# Patient Record
Sex: FEMALE | URBAN - METROPOLITAN AREA
[De-identification: names, ages, dates, MRNs, and addresses within clinical notes are randomized per-mention and may not be internally consistent; named-entity substitution may affect disease eponyms.]

---

## 2019-12-01 ENCOUNTER — OUTPATIENT (OUTPATIENT)
Dept: OUTPATIENT SERVICES | Facility: HOSPITAL | Age: 74
LOS: 1 days | End: 2019-12-01
Payer: MEDICAID

## 2019-12-01 ENCOUNTER — OUTPATIENT (OUTPATIENT)
Dept: OUTPATIENT SERVICES | Facility: HOSPITAL | Age: 74
LOS: 1 days | End: 2019-12-01

## 2019-12-01 PROCEDURE — G9005: CPT

## 2019-12-28 ENCOUNTER — INPATIENT (INPATIENT)
Facility: HOSPITAL | Age: 74
LOS: 2 days | Discharge: HOME HEALTH | End: 2019-12-31
Attending: HOSPITALIST | Admitting: HOSPITALIST
Payer: MEDICAID

## 2019-12-28 VITALS
OXYGEN SATURATION: 100 % | HEART RATE: 125 BPM | RESPIRATION RATE: 18 BRPM | TEMPERATURE: 98 F | SYSTOLIC BLOOD PRESSURE: 155 MMHG | DIASTOLIC BLOOD PRESSURE: 100 MMHG

## 2019-12-28 LAB
ALBUMIN SERPL ELPH-MCNC: 3.5 G/DL — SIGNIFICANT CHANGE UP (ref 3.3–5)
ALP SERPL-CCNC: 79 U/L — SIGNIFICANT CHANGE UP (ref 40–120)
ALT FLD-CCNC: 41 U/L — HIGH (ref 4–33)
ANION GAP SERPL CALC-SCNC: 14 MMO/L — SIGNIFICANT CHANGE UP (ref 7–14)
APPEARANCE UR: CLEAR — SIGNIFICANT CHANGE UP
AST SERPL-CCNC: 70 U/L — HIGH (ref 4–32)
BACTERIA # UR AUTO: NEGATIVE — SIGNIFICANT CHANGE UP
BASOPHILS # BLD AUTO: 0.06 K/UL — SIGNIFICANT CHANGE UP (ref 0–0.2)
BASOPHILS NFR BLD AUTO: 0.5 % — SIGNIFICANT CHANGE UP (ref 0–2)
BILIRUB SERPL-MCNC: < 0.2 MG/DL — LOW (ref 0.2–1.2)
BILIRUB UR-MCNC: NEGATIVE — SIGNIFICANT CHANGE UP
BLOOD UR QL VISUAL: SIGNIFICANT CHANGE UP
BUN SERPL-MCNC: 12 MG/DL — SIGNIFICANT CHANGE UP (ref 7–23)
CALCIUM SERPL-MCNC: 9.3 MG/DL — SIGNIFICANT CHANGE UP (ref 8.4–10.5)
CHLORIDE SERPL-SCNC: 103 MMOL/L — SIGNIFICANT CHANGE UP (ref 98–107)
CO2 SERPL-SCNC: 21 MMOL/L — LOW (ref 22–31)
COLOR SPEC: COLORLESS — SIGNIFICANT CHANGE UP
CREAT SERPL-MCNC: 0.73 MG/DL — SIGNIFICANT CHANGE UP (ref 0.5–1.3)
EOSINOPHIL # BLD AUTO: 0.22 K/UL — SIGNIFICANT CHANGE UP (ref 0–0.5)
EOSINOPHIL NFR BLD AUTO: 1.7 % — SIGNIFICANT CHANGE UP (ref 0–6)
GLUCOSE SERPL-MCNC: 187 MG/DL — HIGH (ref 70–99)
GLUCOSE UR-MCNC: NEGATIVE — SIGNIFICANT CHANGE UP
HCT VFR BLD CALC: 33.9 % — LOW (ref 34.5–45)
HGB BLD-MCNC: 10.3 G/DL — LOW (ref 11.5–15.5)
HYALINE CASTS # UR AUTO: NEGATIVE — SIGNIFICANT CHANGE UP
IMM GRANULOCYTES NFR BLD AUTO: 0.6 % — SIGNIFICANT CHANGE UP (ref 0–1.5)
KETONES UR-MCNC: NEGATIVE — SIGNIFICANT CHANGE UP
LEUKOCYTE ESTERASE UR-ACNC: SIGNIFICANT CHANGE UP
LYMPHOCYTES # BLD AUTO: 18 % — SIGNIFICANT CHANGE UP (ref 13–44)
LYMPHOCYTES # BLD AUTO: 2.28 K/UL — SIGNIFICANT CHANGE UP (ref 1–3.3)
MAGNESIUM SERPL-MCNC: 2.1 MG/DL — SIGNIFICANT CHANGE UP (ref 1.6–2.6)
MCHC RBC-ENTMCNC: 24.1 PG — LOW (ref 27–34)
MCHC RBC-ENTMCNC: 30.4 % — LOW (ref 32–36)
MCV RBC AUTO: 79.2 FL — LOW (ref 80–100)
MONOCYTES # BLD AUTO: 0.82 K/UL — SIGNIFICANT CHANGE UP (ref 0–0.9)
MONOCYTES NFR BLD AUTO: 6.5 % — SIGNIFICANT CHANGE UP (ref 2–14)
NEUTROPHILS # BLD AUTO: 9.18 K/UL — HIGH (ref 1.8–7.4)
NEUTROPHILS NFR BLD AUTO: 72.7 % — SIGNIFICANT CHANGE UP (ref 43–77)
NITRITE UR-MCNC: NEGATIVE — SIGNIFICANT CHANGE UP
NRBC # FLD: 0 K/UL — SIGNIFICANT CHANGE UP (ref 0–0)
PH UR: 6.5 — SIGNIFICANT CHANGE UP (ref 5–8)
PHOSPHATE SERPL-MCNC: 3.1 MG/DL — SIGNIFICANT CHANGE UP (ref 2.5–4.5)
PLATELET # BLD AUTO: 241 K/UL — SIGNIFICANT CHANGE UP (ref 150–400)
PMV BLD: 11.1 FL — SIGNIFICANT CHANGE UP (ref 7–13)
POTASSIUM SERPL-MCNC: 4.6 MMOL/L — SIGNIFICANT CHANGE UP (ref 3.5–5.3)
POTASSIUM SERPL-SCNC: 4.6 MMOL/L — SIGNIFICANT CHANGE UP (ref 3.5–5.3)
PROT SERPL-MCNC: 7.6 G/DL — SIGNIFICANT CHANGE UP (ref 6–8.3)
PROT UR-MCNC: NEGATIVE — SIGNIFICANT CHANGE UP
RBC # BLD: 4.28 M/UL — SIGNIFICANT CHANGE UP (ref 3.8–5.2)
RBC # FLD: 19.4 % — HIGH (ref 10.3–14.5)
RBC CASTS # UR COMP ASSIST: SIGNIFICANT CHANGE UP (ref 0–?)
SODIUM SERPL-SCNC: 138 MMOL/L — SIGNIFICANT CHANGE UP (ref 135–145)
SP GR SPEC: 1.01 — SIGNIFICANT CHANGE UP (ref 1–1.04)
SQUAMOUS # UR AUTO: SIGNIFICANT CHANGE UP
TROPONIN T, HIGH SENSITIVITY: < 6 NG/L — SIGNIFICANT CHANGE UP (ref ?–14)
TSH SERPL-MCNC: 4.19 UIU/ML — SIGNIFICANT CHANGE UP (ref 0.27–4.2)
UROBILINOGEN FLD QL: NORMAL — SIGNIFICANT CHANGE UP
WBC # BLD: 12.64 K/UL — HIGH (ref 3.8–10.5)
WBC # FLD AUTO: 12.64 K/UL — HIGH (ref 3.8–10.5)
WBC UR QL: SIGNIFICANT CHANGE UP (ref 0–?)

## 2019-12-28 PROCEDURE — 71045 X-RAY EXAM CHEST 1 VIEW: CPT | Mod: 26

## 2019-12-28 RX ORDER — SODIUM CHLORIDE 9 MG/ML
2000 INJECTION, SOLUTION INTRAVENOUS ONCE
Refills: 0 | Status: COMPLETED | OUTPATIENT
Start: 2019-12-28 | End: 2019-12-28

## 2019-12-28 RX ORDER — MECLIZINE HCL 12.5 MG
25 TABLET ORAL ONCE
Refills: 0 | Status: COMPLETED | OUTPATIENT
Start: 2019-12-28 | End: 2019-12-28

## 2019-12-28 RX ADMIN — SODIUM CHLORIDE 1000 MILLILITER(S): 9 INJECTION, SOLUTION INTRAVENOUS at 21:02

## 2019-12-28 NOTE — ED ADULT TRIAGE NOTE - CHIEF COMPLAINT QUOTE
Pt c/o weakness and dizziness x few weeks. PMH hypothyroid, htn, high cholesterol, DM.  in the field

## 2019-12-28 NOTE — ED ADULT NURSE NOTE - OBJECTIVE STATEMENT
ALOCx4 Pashto primary language. Pt complaining of generalized weakness to entire body x 3 weeks. Also comlpaining of on and off dizziness x 6 months upon arrival to the [place].

## 2019-12-29 DIAGNOSIS — E78.5 HYPERLIPIDEMIA, UNSPECIFIED: ICD-10-CM

## 2019-12-29 DIAGNOSIS — G91.9 HYDROCEPHALUS, UNSPECIFIED: ICD-10-CM

## 2019-12-29 DIAGNOSIS — E11.9 TYPE 2 DIABETES MELLITUS WITHOUT COMPLICATIONS: ICD-10-CM

## 2019-12-29 DIAGNOSIS — R42 DIZZINESS AND GIDDINESS: ICD-10-CM

## 2019-12-29 DIAGNOSIS — K21.9 GASTRO-ESOPHAGEAL REFLUX DISEASE WITHOUT ESOPHAGITIS: ICD-10-CM

## 2019-12-29 DIAGNOSIS — I10 ESSENTIAL (PRIMARY) HYPERTENSION: ICD-10-CM

## 2019-12-29 DIAGNOSIS — D64.9 ANEMIA, UNSPECIFIED: ICD-10-CM

## 2019-12-29 DIAGNOSIS — J45.909 UNSPECIFIED ASTHMA, UNCOMPLICATED: ICD-10-CM

## 2019-12-29 DIAGNOSIS — Z29.9 ENCOUNTER FOR PROPHYLACTIC MEASURES, UNSPECIFIED: ICD-10-CM

## 2019-12-29 DIAGNOSIS — R26.81 UNSTEADINESS ON FEET: ICD-10-CM

## 2019-12-29 LAB
ANION GAP SERPL CALC-SCNC: 13 MMO/L — SIGNIFICANT CHANGE UP (ref 7–14)
BUN SERPL-MCNC: 8 MG/DL — SIGNIFICANT CHANGE UP (ref 7–23)
CALCIUM SERPL-MCNC: 9.7 MG/DL — SIGNIFICANT CHANGE UP (ref 8.4–10.5)
CHLORIDE SERPL-SCNC: 103 MMOL/L — SIGNIFICANT CHANGE UP (ref 98–107)
CO2 SERPL-SCNC: 26 MMOL/L — SIGNIFICANT CHANGE UP (ref 22–31)
CREAT SERPL-MCNC: 0.65 MG/DL — SIGNIFICANT CHANGE UP (ref 0.5–1.3)
GLUCOSE BLDC GLUCOMTR-MCNC: 179 MG/DL — HIGH (ref 70–99)
GLUCOSE BLDC GLUCOMTR-MCNC: 188 MG/DL — HIGH (ref 70–99)
GLUCOSE BLDC GLUCOMTR-MCNC: 222 MG/DL — HIGH (ref 70–99)
GLUCOSE BLDC GLUCOMTR-MCNC: 268 MG/DL — HIGH (ref 70–99)
GLUCOSE BLDC GLUCOMTR-MCNC: 93 MG/DL — SIGNIFICANT CHANGE UP (ref 70–99)
GLUCOSE SERPL-MCNC: 119 MG/DL — HIGH (ref 70–99)
HCT VFR BLD CALC: 37.8 % — SIGNIFICANT CHANGE UP (ref 34.5–45)
HGB BLD-MCNC: 11.2 G/DL — LOW (ref 11.5–15.5)
MAGNESIUM SERPL-MCNC: 2.1 MG/DL — SIGNIFICANT CHANGE UP (ref 1.6–2.6)
MCHC RBC-ENTMCNC: 24 PG — LOW (ref 27–34)
MCHC RBC-ENTMCNC: 29.6 % — LOW (ref 32–36)
MCV RBC AUTO: 81.1 FL — SIGNIFICANT CHANGE UP (ref 80–100)
NRBC # FLD: 0 K/UL — SIGNIFICANT CHANGE UP (ref 0–0)
PHOSPHATE SERPL-MCNC: 3.1 MG/DL — SIGNIFICANT CHANGE UP (ref 2.5–4.5)
PLATELET # BLD AUTO: 252 K/UL — SIGNIFICANT CHANGE UP (ref 150–400)
PMV BLD: 11.8 FL — SIGNIFICANT CHANGE UP (ref 7–13)
POTASSIUM SERPL-MCNC: 3.9 MMOL/L — SIGNIFICANT CHANGE UP (ref 3.5–5.3)
POTASSIUM SERPL-SCNC: 3.9 MMOL/L — SIGNIFICANT CHANGE UP (ref 3.5–5.3)
RBC # BLD: 4.66 M/UL — SIGNIFICANT CHANGE UP (ref 3.8–5.2)
RBC # FLD: 19.7 % — HIGH (ref 10.3–14.5)
SODIUM SERPL-SCNC: 142 MMOL/L — SIGNIFICANT CHANGE UP (ref 135–145)
WBC # BLD: 11.44 K/UL — HIGH (ref 3.8–10.5)
WBC # FLD AUTO: 11.44 K/UL — HIGH (ref 3.8–10.5)

## 2019-12-29 PROCEDURE — 99223 1ST HOSP IP/OBS HIGH 75: CPT | Mod: GC

## 2019-12-29 PROCEDURE — 70496 CT ANGIOGRAPHY HEAD: CPT | Mod: 26

## 2019-12-29 PROCEDURE — 70498 CT ANGIOGRAPHY NECK: CPT | Mod: 26

## 2019-12-29 RX ORDER — LOSARTAN/HYDROCHLOROTHIAZIDE 100MG-25MG
1 TABLET ORAL
Qty: 0 | Refills: 0 | DISCHARGE

## 2019-12-29 RX ORDER — ENOXAPARIN SODIUM 100 MG/ML
40 INJECTION SUBCUTANEOUS DAILY
Refills: 0 | Status: DISCONTINUED | OUTPATIENT
Start: 2019-12-29 | End: 2019-12-31

## 2019-12-29 RX ORDER — HYDROXYZINE HCL 10 MG
10 TABLET ORAL DAILY
Refills: 0 | Status: DISCONTINUED | OUTPATIENT
Start: 2019-12-29 | End: 2019-12-29

## 2019-12-29 RX ORDER — INFLUENZA VIRUS VACCINE 15; 15; 15; 15 UG/.5ML; UG/.5ML; UG/.5ML; UG/.5ML
0.5 SUSPENSION INTRAMUSCULAR ONCE
Refills: 0 | Status: DISCONTINUED | OUTPATIENT
Start: 2019-12-29 | End: 2019-12-31

## 2019-12-29 RX ORDER — INSULIN LISPRO 100/ML
VIAL (ML) SUBCUTANEOUS AT BEDTIME
Refills: 0 | Status: DISCONTINUED | OUTPATIENT
Start: 2019-12-29 | End: 2019-12-31

## 2019-12-29 RX ORDER — DEXTROSE 50 % IN WATER 50 %
25 SYRINGE (ML) INTRAVENOUS ONCE
Refills: 0 | Status: DISCONTINUED | OUTPATIENT
Start: 2019-12-29 | End: 2019-12-31

## 2019-12-29 RX ORDER — DEXTROSE 50 % IN WATER 50 %
15 SYRINGE (ML) INTRAVENOUS ONCE
Refills: 0 | Status: DISCONTINUED | OUTPATIENT
Start: 2019-12-29 | End: 2019-12-31

## 2019-12-29 RX ORDER — FAMOTIDINE 10 MG/ML
1 INJECTION INTRAVENOUS
Qty: 0 | Refills: 0 | DISCHARGE

## 2019-12-29 RX ORDER — SIMVASTATIN 20 MG/1
20 TABLET, FILM COATED ORAL AT BEDTIME
Refills: 0 | Status: DISCONTINUED | OUTPATIENT
Start: 2019-12-29 | End: 2019-12-31

## 2019-12-29 RX ORDER — FERROUS SULFATE 325(65) MG
1 TABLET ORAL
Qty: 0 | Refills: 0 | DISCHARGE

## 2019-12-29 RX ORDER — FAMOTIDINE 10 MG/ML
20 INJECTION INTRAVENOUS DAILY
Refills: 0 | Status: DISCONTINUED | OUTPATIENT
Start: 2019-12-29 | End: 2019-12-31

## 2019-12-29 RX ORDER — FERROUS SULFATE 325(65) MG
325 TABLET ORAL DAILY
Refills: 0 | Status: DISCONTINUED | OUTPATIENT
Start: 2019-12-29 | End: 2019-12-31

## 2019-12-29 RX ORDER — MONTELUKAST 4 MG/1
10 TABLET, CHEWABLE ORAL DAILY
Refills: 0 | Status: DISCONTINUED | OUTPATIENT
Start: 2019-12-29 | End: 2019-12-31

## 2019-12-29 RX ORDER — LOSARTAN POTASSIUM 100 MG/1
50 TABLET, FILM COATED ORAL DAILY
Refills: 0 | Status: DISCONTINUED | OUTPATIENT
Start: 2019-12-29 | End: 2019-12-31

## 2019-12-29 RX ORDER — HYDROCHLOROTHIAZIDE 25 MG
12.5 TABLET ORAL DAILY
Refills: 0 | Status: DISCONTINUED | OUTPATIENT
Start: 2019-12-29 | End: 2019-12-29

## 2019-12-29 RX ORDER — DEXTROSE 50 % IN WATER 50 %
12.5 SYRINGE (ML) INTRAVENOUS ONCE
Refills: 0 | Status: DISCONTINUED | OUTPATIENT
Start: 2019-12-29 | End: 2019-12-31

## 2019-12-29 RX ORDER — GLUCAGON INJECTION, SOLUTION 0.5 MG/.1ML
1 INJECTION, SOLUTION SUBCUTANEOUS ONCE
Refills: 0 | Status: DISCONTINUED | OUTPATIENT
Start: 2019-12-29 | End: 2019-12-31

## 2019-12-29 RX ORDER — MONTELUKAST 4 MG/1
1 TABLET, CHEWABLE ORAL
Qty: 0 | Refills: 0 | DISCHARGE

## 2019-12-29 RX ORDER — LEVOTHYROXINE SODIUM 125 MCG
1 TABLET ORAL
Qty: 0 | Refills: 0 | DISCHARGE

## 2019-12-29 RX ORDER — SIMVASTATIN 20 MG/1
1 TABLET, FILM COATED ORAL
Qty: 0 | Refills: 0 | DISCHARGE

## 2019-12-29 RX ORDER — LEVOTHYROXINE SODIUM 125 MCG
50 TABLET ORAL DAILY
Refills: 0 | Status: DISCONTINUED | OUTPATIENT
Start: 2019-12-29 | End: 2019-12-31

## 2019-12-29 RX ORDER — INSULIN LISPRO 100/ML
VIAL (ML) SUBCUTANEOUS
Refills: 0 | Status: DISCONTINUED | OUTPATIENT
Start: 2019-12-29 | End: 2019-12-31

## 2019-12-29 RX ORDER — SODIUM CHLORIDE 9 MG/ML
1000 INJECTION, SOLUTION INTRAVENOUS
Refills: 0 | Status: DISCONTINUED | OUTPATIENT
Start: 2019-12-29 | End: 2019-12-31

## 2019-12-29 RX ADMIN — SIMVASTATIN 20 MILLIGRAM(S): 20 TABLET, FILM COATED ORAL at 22:40

## 2019-12-29 RX ADMIN — LOSARTAN POTASSIUM 50 MILLIGRAM(S): 100 TABLET, FILM COATED ORAL at 13:00

## 2019-12-29 RX ADMIN — ENOXAPARIN SODIUM 40 MILLIGRAM(S): 100 INJECTION SUBCUTANEOUS at 12:59

## 2019-12-29 RX ADMIN — MONTELUKAST 10 MILLIGRAM(S): 4 TABLET, CHEWABLE ORAL at 13:00

## 2019-12-29 RX ADMIN — FAMOTIDINE 20 MILLIGRAM(S): 10 INJECTION INTRAVENOUS at 13:00

## 2019-12-29 RX ADMIN — Medication 6: at 12:59

## 2019-12-29 RX ADMIN — Medication 1 TABLET(S): at 13:03

## 2019-12-29 RX ADMIN — Medication 325 MILLIGRAM(S): at 13:00

## 2019-12-29 NOTE — DISCHARGE NOTE PROVIDER - HOSPITAL COURSE
75 yo F PMh DM, HTN, HLD, hypothyroidism, GERD presenting with lightheadedness, dizziness, unsteady gait, and increasing urinary frequency for months duration.  CTH findings concerning for communicating hydrocephalus.  Neurology was consulted and recommened BLANK 75 yo F PMh DM, HTN, HLD, hypothyroidism, GERD presenting with lightheadedness, dizziness, unsteady gait, and increasing urinary frequency for months duration.  CTH findings concerning for communicating hydrocephalus.  Neurology was consulted and recommened a brain MRI which showed BLANK. 75 yo F PMh DM, HTN, HLD, hypothyroidism, GERD presenting with lightheadedness, dizziness, unsteady gait, and increasing urinary frequency for months duration.  CTH findings concerning for communicating hydrocephalus.  Neurology was consulted and recommened a brain MRI which showed NPH. however neurology not convinced NPH due to lack of clinical features. 75 yo F PMh DM, HTN, HLD, hypothyroidism, GERD presenting with lightheadedness, dizziness, unsteady gait, and increasing urinary frequency for months duration.  CTH findings concerning for communicating hydrocephalus.  Neurology was consulted and recommened a brain MRI which showed NPH. however neurology not convinced NPH due to lack of clinical features.  Recommend outpatient follow up of MRI findings. 73 yo F PMh DM, HTN, HLD, hypothyroidism, GERD presenting with lightheadedness, dizziness, unsteady gait, and increasing urinary frequency for months duration.  CTH findings concerning for communicating hydrocephalus.  Neurology was consulted and recommened a brain MRI which showed NPH. however neurology not convinced NPH due to lack of clinical features.  Recommend outpatient follow up of MRI findings and follow up with PCP and neurologist. 75 yo F PMh DM, HTN, HLD, hypothyroidism, GERD presenting with lightheadedness, dizziness, unsteady gait, and increasing urinary frequency for months duration.  CTH findings concerning for communicating hydrocephalus.  Neurology was consulted and recommend a brain MRI which would be consist with NPH and was negative for CVA or other central pathology;  however neurology not convinced that symptomatically meets criteria for NPH due to lack of clinical features.  Recommend outpatient follow up of MRI findings and follow up with PCP and neurologist.

## 2019-12-29 NOTE — H&P ADULT - NSHPREVIEWOFSYSTEMS_GEN_ALL_CORE
CONSTITUTIONAL: Denies weakness, fevers, chills  EYES/ENT: Denies throat pain   NECK: Denies pain, stiffness  RESPIRATORY: Denies cough, wheezing, hemoptysis; Denies shortness of breath  CARDIOVASCULAR: Denies chest pain or palpitations  GASTROINTESTINAL: Denies abdominal or epigastric pain. Denies nausea, vomiting, hematemesis, diarrhea, constipation, melena, or hematochezia  GENITOURINARY: Denies dysuria hematuria  NEUROLOGICAL: Denies numbness or weakness  SKIN: Denies itching, rashes

## 2019-12-29 NOTE — H&P ADULT - PROBLEM SELECTOR PLAN 2
based on CTH findings  associated w/ dizziness, unsteady gait, and lightheadedness --> concern for NPH  -will f/u neuro recs  -f/u PT recs  -neuro check q4 hrs

## 2019-12-29 NOTE — H&P ADULT - ATTENDING COMMENTS
I have personally seen, examined, and participated in the care of this patient, I have reviewed all pertinent clinical information, including history, physical exam, plan, and the above resident's note. The case and plan have been discussed with resident, above note edited where appropriate.   Gertrude Rascon MD

## 2019-12-29 NOTE — H&P ADULT - PROBLEM SELECTOR PLAN 1
based on symptoms and CT findings, ddx includes hydrocephalus vs orthostatic hypotension 2/2 uncontrolled DM vs anemia vs cardiac  -U/A, CXR clear  -f/u orthostatics  -f/u neuro recs for CT findings  -f/u vitamin B12  -f/u Hbg A1c  -f/u PT recs  -consider TTE if cardiac etiology concerning

## 2019-12-29 NOTE — H&P ADULT - NSHPSOCIALHISTORY_GEN_ALL_CORE
never smoker  no Hx alcohol or other illicit drug use  lives at home with grandson, daughter, and son in law  from [facility]

## 2019-12-29 NOTE — H&P ADULT - NSHPPHYSICALEXAM_GEN_ALL_CORE
Vital Signs Last 24 Hrs  T(C): 36.9 (29 Dec 2019 07:48), Max: 36.9 (29 Dec 2019 07:48)  T(F): 98.5 (29 Dec 2019 07:48), Max: 98.5 (29 Dec 2019 07:48)  HR: 98 (29 Dec 2019 07:48) (98 - 125)  BP: 140/81 (29 Dec 2019 07:48) (122/79 - 155/100)  BP(mean): --  RR: 17 (29 Dec 2019 07:48) (16 - 18)  SpO2: 100% (29 Dec 2019 07:48) (96% - 100%)    Constitutional: NAD  Eyes: PERRL b/l  ENMT: NC/AT, MMM  Respiratory: clear to auscultation b/l, no wheezes  Cardiovascular: RRR no murmurs rubs or gallops; no JVD  Gastrointestinal: abdomen soft, nontender, nondistended; bowel sounds all 4 quadrants  Extremities: pulses intact b/l; no peripheral edema  Neurological: AO x 3; strength 5/5 upper and lower extremities b/l; sensation intact to light touch b/l; CN II-XII grossly intact; unsteady, wide-based gait with ambulation  Skin: no rashes or lesions  Psychiatric: calm

## 2019-12-29 NOTE — DISCHARGE NOTE PROVIDER - NSDCFUADDAPPT_GEN_ALL_CORE_FT
-please follow up with primary care doctor in 1 week (Dr. Bashir) -please follow up with primary care doctor on January 8th at 12 PM -please follow up with primary care doctor on January 8th at 12 PM  -please follow up with neurology as outpatient (can call [phone] for [facility], [place])

## 2019-12-29 NOTE — ED PROVIDER NOTE - ATTENDING CONTRIBUTION TO CARE
74F h/o DM, HTN, HLD, hypothyroidism pw weakness, dizziness and gait instability x months but acutely worsening the past week. Reports that she feels dizzy whenever she gets up or changes positions, States that she had gone to PMD a month ago and given meclizine with some improvement. Son states that hse has appeared weaker with a slower gait than usual. Reports frequent urination but no issues with incontinence. Denies cp, sob abd pain, fever/chills, n/v/d.  Gen: nad  CV: rrr, no murmur  Pulm: clear lungs  Abd: soft, nt, nd  Neuro: CN 2-12 intact, sensory/motor grossly intact, unable to fully assess cerebellar function as pt not understanding prompts despite son showing how to do  MDM: 74F h/o DM, HTN, HLD, hypothyroidism pw weakness, dizziness and gait instability x months but acutely worsening the past week - ddx includes neuro vs metabolic vs infectious - neuro NPH vs vertigo will get CTH, CTA head and neck to r/o cerebellar stroke; check electrolytes, trop and ekg, ua and cxr

## 2019-12-29 NOTE — H&P ADULT - NSHPLABSRESULTS_GEN_ALL_CORE
.  LABS:                         10.3   12.64 )-----------( 241      ( 28 Dec 2019 21:03 )             33.9         138  |  103  |  12  ----------------------------<  187<H>  4.6   |  21<L>  |  0.73    Ca    9.3      28 Dec 2019 21:03  Phos  3.1       Mg     2.1         TPro  7.6  /  Alb  3.5  /  TBili  < 0.2<L>  /  DBili  x   /  AST  70<H>  /  ALT  41<H>  /  AlkPhos  79        Urinalysis Basic - ( 28 Dec 2019 09:55 )    Color: COLORLESS / Appearance: CLEAR / S.006 / pH: 6.5  Gluc: NEGATIVE / Ketone: NEGATIVE  / Bili: NEGATIVE / Urobili: NORMAL   Blood: TRACE / Protein: NEGATIVE / Nitrite: NEGATIVE   Leuk Esterase: TRACE / RBC: 0-2 / WBC 3-5   Sq Epi: OCC / Non Sq Epi: x / Bacteria: NEGATIVE            RADIOLOGY, EKG & ADDITIONAL TESTS: Reviewed.

## 2019-12-29 NOTE — ED PROVIDER NOTE - NS ED ROS FT
General: No fevers / chills, +malaise, +weakness   HENT: No head trauma, ear pain, runny nose, or sore throat  Eyes: No visual changes  CP: No chest pain, palpitations, or lightheadedness  Resp: No shortness of breath, no cough  GI: No abdominal pain, diarrhea, constipation, nausea, or vomiting  : No urinary fz, dysuria, or hematuria  Neuro: +dizziness, +unsteady gait, No numbness, tingling, or weakness

## 2019-12-29 NOTE — CONSULT NOTE ADULT - ATTENDING COMMENTS
Patient was presented on rounds and examined at the bedside.  Hx taken with assistance of Daniel . Patient describes vertiginous Sx upon standing and walking along with feeling of dry mouth, difficulty swallowing and feelings of anxiety that may last minutes to hours.  The gait is narrow based and steady without a shuffling quality or need for ambulatory aid. Doubt Sx are related to NPH. Agree with medical management of orthostatic hypotension and F/U MRI.

## 2019-12-29 NOTE — CONSULT NOTE ADULT - SUBJECTIVE AND OBJECTIVE BOX
HPI: 74 year old RH F with a PMH of T2DM, HTN, HLD, GERD, hypothyroidism, asthma presenting with acute on chronic dizziness and unsteady gait. Patient has been having fatigue and dizziness (described as a "spinning" sensation) with resultant difficulty walking for the past year. She states that over the past one month, her dizziness has been worsening. She sometimes (less than daily) urinates on herself because she is unable to reach the bathroom in time due to her dizziness. Her dizziness worsens when she moves her head. She is assisted by her daughter with her ADL's. She denies recent falls. Patient's family (grandson/daughter) deny any significant worsening in cognitive function (state that patient sometimes forgets what she ate earlier in the day).    Some difficulty in obtaining history as patient would perseverate on certain topics.     Isha  ID: [id number]    NIHSS: 0  MRS: 0      REVIEW OF SYSTEMS    A 10-system ROS was performed and is negative except for those items noted above and/or in the HPI.    PAST MEDICAL & SURGICAL HISTORY:  Chronic GERD  Asthma  Hypothyroidism  Hyperlipidemia  Hypertension  Type 2 diabetes mellitus    FAMILY HISTORY:    SOCIAL HISTORY:   T/E/D:   Lives with: family    MEDICATIONS (HOME):  Home Medications:  Calcium 500+D oral tablet, chewable: 1 tab(s) orally once a day (29 Dec 2019 09:49)  ferrous sulfate 324 mg (65 mg elemental iron) oral tablet: 1 tab(s) orally once a day (29 Dec 2019 09:49)  hydrOXYzine hydrochloride 10 mg oral tablet: 1 tab(s) orally once a day (29 Dec 2019 09:49)  Hyzaar 50 mg-12.5 mg oral tablet: 1 tab(s) orally once a day (29 Dec 2019 09:49)  levothyroxine 50 mcg (0.05 mg) oral tablet: 1 tab(s) orally once a day (29 Dec 2019 09:49)  metFORMIN 500 mg oral tablet: 1 tab(s) orally once a day (29 Dec 2019 09:49)  montelukast 10 mg oral tablet: 1 tab(s) orally once a day (29 Dec 2019 09:49)  Multiple Vitamins oral capsule: 1 cap(s) orally once a day (29 Dec 2019 09:49)  Pepcid 20 mg oral tablet: 1 tab(s) orally once a day (29 Dec 2019 09:49)  simvastatin 20 mg oral tablet: 1 tab(s) orally once a day (at bedtime) (29 Dec 2019 09:49)    MEDICATIONS  (STANDING):  dextrose 5%. 1000 milliLiter(s) (50 mL/Hr) IV Continuous <Continuous>  dextrose 50% Injectable 12.5 Gram(s) IV Push once  dextrose 50% Injectable 25 Gram(s) IV Push once  dextrose 50% Injectable 25 Gram(s) IV Push once  enoxaparin Injectable 40 milliGRAM(s) SubCutaneous daily  famotidine    Tablet 20 milliGRAM(s) Oral daily  ferrous    sulfate 325 milliGRAM(s) Oral daily  influenza   Vaccine 0.5 milliLiter(s) IntraMuscular once  insulin lispro (HumaLOG) corrective regimen sliding scale   SubCutaneous three times a day before meals  insulin lispro (HumaLOG) corrective regimen sliding scale   SubCutaneous at bedtime  levothyroxine 50 MICROGram(s) Oral daily  losartan 50 milliGRAM(s) Oral daily  montelukast 10 milliGRAM(s) Oral daily  multivitamin 1 Tablet(s) Oral daily  simvastatin 20 milliGRAM(s) Oral at bedtime    MEDICATIONS  (PRN):  dextrose 40% Gel 15 Gram(s) Oral once PRN Blood Glucose LESS THAN 70 milliGRAM(s)/deciliter  glucagon  Injectable 1 milliGRAM(s) IntraMuscular once PRN Glucose LESS THAN 70 milligrams/deciliter    ALLERGIES/INTOLERANCES:  Allergies  No Known Allergies    Intolerances    VITALS & EXAMINATION:  Vital Signs Last 24 Hrs  T(C): 36.5 (29 Dec 2019 14:27), Max: 36.9 (29 Dec 2019 07:48)  T(F): 97.7 (29 Dec 2019 14:27), Max: 98.5 (29 Dec 2019 07:48)  HR: 115 (29 Dec 2019 14:27) (98 - 125)  BP: 146/76 (29 Dec 2019 14:27) (122/79 - 155/100)  BP(mean): --  RR: 17 (29 Dec 2019 14:27) (16 - 18)  SpO2: 98% (29 Dec 2019 14:27) (96% - 100%)    General: Elderly Female, appears stated age, in no apparent distress including pain    Eyes: arcus senilis, s/p cataract surgery    Neurological (>12):  MS: Awake, alert, oriented to person, place, situation, month. Normal affect. Follows all commands.    Language: Speech is clear, fluent    CNs: PERRL (R = 3mm, L = 3mm). VFF by BTT, no nystagmus. EOMI. V1-3 intact to LTl. No facial asymmetry b/l. Symmetric palate elevation in midline. Gag reflex deferred. Head turning & shoulder shrug intact b/l. Tongue midline, normal movements, no atrophy.    Fundoscopic: not examined     Motor: Normal muscle bulk & tone. No pronator drift.              Deltoid	Biceps	Triceps		 	             R	5	5	5				5 	  L	5	5	5				5    	H-Flex	H-Ext			K-Flex	K-Ext	D-Flex	P-Flex  R	5	5			5	5	5	5 	   L	5	5			5	5	5	5	     Sensation: Intact to LT b/l throughout.     Reflexes:              Biceps(C5)       BR(C6)     Triceps(C7)               Patellar(L4)    Achilles(S1)    Plantar Resp  R	1	          1             1		        0 or trace		    1		Down   L	1	          1	             1		        0 or trace		    1		Down     Coordination: No dysmetria to FTN    Gait: Positive Romberg. Slightly wide based slow gait. Felt worsening dizziness upon standing up.     Head impulse: no corrective saccades (central)  Test of skew: no vertical skew deviation    LABORATORY:  CBC                       11.2   11.44 )-----------( 252      ( 29 Dec 2019 10:24 )             37.8     Chem     142  |  103  |  8   ----------------------------<  119<H>  3.9   |  26  |  0.65    Ca    9.7      29 Dec 2019 10:24  Phos  3.1       Mg     2.1         TPro  7.6  /  Alb  3.5  /  TBili  < 0.2<L>  /  DBili  x   /  AST  70<H>  /  ALT  41<H>  /  AlkPhos  79      LFTs LIVER FUNCTIONS - ( 28 Dec 2019 21:03 )  Alb: 3.5 g/dL / Pro: 7.6 g/dL / ALK PHOS: 79 u/L / ALT: 41 u/L / AST: 70 u/L / GGT: x           Coagulopathy   Lipid Panel   A1c   Cardiac enzymes     U/A Urinalysis Basic - ( 28 Dec 2019 09:55 )    Color: COLORLESS / Appearance: CLEAR / S.006 / pH: 6.5  Gluc: NEGATIVE / Ketone: NEGATIVE  / Bili: NEGATIVE / Urobili: NORMAL   Blood: TRACE / Protein: NEGATIVE / Nitrite: NEGATIVE   Leuk Esterase: TRACE / RBC: 0-2 / WBC 3-5   Sq Epi: OCC / Non Sq Epi: x / Bacteria: NEGATIVE      CSF  Immunological  Other    STUDIES & IMAGING:  Studies (EKG, EEG, EMG, etc):     Radiology (XR, CT, MR, U/S, TTE/ABDIRAHMAN):  < from: CT Angio Head w/ IV Cont (19 @ 02:06) >  IMPRESSION:     CT HEAD: No acute intracranial bleeding.    Disproportionate enlargement of the ventricular system in relation to the high convexity sulci  due to from communicating hydrocephalus and/or advanced central volume loss.    CTA BRAIN: Patent intracranial circulation. No flow-limiting stenosis or occlusion.     CTA NECK: Retropharyngeal carotid course bilaterally. Patent cervical vasculature. No flow limiting stenosis or occlusion.     < end of copied text >

## 2019-12-29 NOTE — ED PROVIDER NOTE - PROGRESS NOTE DETAILS
Pt seen by neuro given persistent gait instability concerning for peripheral vertigo and NPH on CT, will admit to medicine for further management.

## 2019-12-29 NOTE — DISCHARGE NOTE PROVIDER - NSDCCPCAREPLAN_GEN_ALL_CORE_FT
PRINCIPAL DISCHARGE DIAGNOSIS  Diagnosis: Gait instability  Assessment and Plan of Treatment: this was caused by hydrocephalus (as evidenced by CT head); neurology saw you for this and recommended BLANK PRINCIPAL DISCHARGE DIAGNOSIS  Diagnosis: Gait instability  Assessment and Plan of Treatment: this was caused by hydrocephalus (as evidenced by CT head); neurology saw you for this and recommended brain MRI which showed BLANK. PRINCIPAL DISCHARGE DIAGNOSIS  Diagnosis: Gait instability  Assessment and Plan of Treatment: this was caused by ROSAURA.  neurology saw you for this and recommended brain MRI which showed normal pressure hydrocephalus, however they said this was not the cause of your symptoms. PRINCIPAL DISCHARGE DIAGNOSIS  Diagnosis: Gait instability  Assessment and Plan of Treatment: this was caused by an uknown etiology.  neurology saw you for this and recommended brain MRI which showed normal pressure hydrocephalus, however they said this was not the cause of your symptoms.  your MRI results were sent to your primary care doctor- please follow up with him regarding further workup of this

## 2019-12-29 NOTE — ED PROVIDER NOTE - OBJECTIVE STATEMENT
75 yo F hx of htn, dm, hld, hypothyroid presents with weakness, dizziness and gait instability that has been going on for several months. Per grandson at bedside, reports she feels dizzy whenever she gets up, took meclizine in the past for vertigo but unclear if sx are similar to this. Has also been weaker lately and has a slower gait. Denies cp, sob, fever/chills.

## 2019-12-29 NOTE — DISCHARGE NOTE PROVIDER - NSDCMRMEDTOKEN_GEN_ALL_CORE_FT
Calcium 500+D oral tablet, chewable: 1 tab(s) orally once a day  ferrous sulfate 324 mg (65 mg elemental iron) oral tablet: 1 tab(s) orally once a day  hydrOXYzine hydrochloride 10 mg oral tablet: 1 tab(s) orally once a day  Hyzaar 50 mg-12.5 mg oral tablet: 1 tab(s) orally once a day  levothyroxine 50 mcg (0.05 mg) oral tablet: 1 tab(s) orally once a day  metFORMIN 500 mg oral tablet: 1 tab(s) orally once a day  montelukast 10 mg oral tablet: 1 tab(s) orally once a day  Multiple Vitamins oral capsule: 1 cap(s) orally once a day  Pepcid 20 mg oral tablet: 1 tab(s) orally once a day  simvastatin 20 mg oral tablet: 1 tab(s) orally once a day (at bedtime) Calcium 500+D oral tablet, chewable: 1 tab(s) orally once a day  ferrous sulfate 324 mg (65 mg elemental iron) oral tablet: 1 tab(s) orally once a day  Hyzaar 50 mg-12.5 mg oral tablet: 1 tab(s) orally once a day  levothyroxine 50 mcg (0.05 mg) oral tablet: 1 tab(s) orally once a day  metFORMIN 500 mg oral tablet: 1 tab(s) orally 2 times a day   montelukast 10 mg oral tablet: 1 tab(s) orally once a day  Multiple Vitamins oral capsule: 1 cap(s) orally once a day  Pepcid 20 mg oral tablet: 1 tab(s) orally once a day  simvastatin 20 mg oral tablet: 1 tab(s) orally once a day (at bedtime) Calcium 500+D oral tablet, chewable: 1 tab(s) orally once a day  ferrous sulfate 324 mg (65 mg elemental iron) oral tablet: 1 tab(s) orally once a day  Hyzaar 50 mg-12.5 mg oral tablet: 1 tab(s) orally once a day  levothyroxine 50 mcg (0.05 mg) oral tablet: 1 tab(s) orally once a day  metFORMIN 500 mg oral tablet: 1 tab(s) orally 2 times a day   montelukast 10 mg oral tablet: 1 tab(s) orally once a day  Multiple Vitamins oral capsule: 1 cap(s) orally once a day  Pepcid 20 mg oral tablet: 1 tab(s) orally once a day  rolling walker 5 inch wheels: 1 application buccal once a day   simvastatin 20 mg oral tablet: 1 tab(s) orally once a day (at bedtime)

## 2019-12-29 NOTE — ED PROVIDER NOTE - CLINICAL SUMMARY MEDICAL DECISION MAKING FREE TEXT BOX
pt p/w weakness, dizziness, vague sx, HINTS exam with saccade consistent with peripheral vertigo however has slow moving magnetic gait, CT consistent with ?hydrocephalus, evaluated by neuro, will be admitted for further work up as inpatient, stable throughout ED course  Christie Hadley, PGY-3 EM

## 2019-12-29 NOTE — H&P ADULT - NSICDXPASTMEDICALHX_GEN_ALL_CORE_FT
PAST MEDICAL HISTORY:  Asthma     Chronic GERD     Hyperlipidemia     Hypertension     Hypothyroidism     Type 2 diabetes mellitus

## 2019-12-29 NOTE — H&P ADULT - PROBLEM SELECTOR PLAN 3
Hng 10.3 on presentation  -[facility] baseline and colonoscopy Hx  -obtain colonoscopy Hx  -iron studies  -ferritin, TIBC, retic count  -CBC qd Hng 10.3 on presentation  -[facility] baseline and colonoscopy Hx  -c/w ferrous sulfate  -obtain colonoscopy Hx  -iron studies  -ferritin, TIBC, retic count  -CBC qd Hg 10.3 on presentation  -unknown baseline and colonoscopy Hx  -c/w ferrous sulfate, no current indications of infection  -obtain colonoscopy Hx  -iron studies  -ferritin, TIBC, retic count  -CBC qd

## 2019-12-29 NOTE — H&P ADULT - HISTORY OF PRESENT ILLNESS
73 yo F PMH DM type 2, HTN, HLD, GERD, hypothyroidism, asthma presenting for lightheadedness and unsteady gait.  Several months ago, patient began to experience gradual onset of dizziness and lightheadedness.  These symptoms begin after the patient stands up and last several minutes before spontaneously remitting.  This lightheadedness is asscociated with unsteadiness and a wobbly gait, requiring the patient to reach for handrails or other support while walking.  These symptoms, while not increased in severity, have increased in frequency over the past several months.  At the time of symptom onset, the patient went to her PCP, Dr. Bashir, who prescribed meclizine which did not relieve her symptoms.  At the onset of her symptoms, the patient also experienced an increase in urinary frequency which has been gradually worsening over the past several months.  The patient went to her PCP for these symptoms as well, who diagnosed her with an "infection," however did not prescribe antibiotics.  In addition to the above symptoms, the patient has also begun to experience a R sided occipital headache and intermittent "darkening of vision" over the past several months.  The patient denies confusion, numbness/tingling in the extremities, peripheral edema, CP, SOB, abdominal pain, N/V, or changes in bowel habits.  The patient denies recent sick contacts, however arrived to the [place] from [facility] several months ago before the onset of her symptoms.  She has an unknown vaccination record.    In the ED, patient found to have labs significant for WBC count 12.6, negative U/A, and CXR showing no acute processes.  Neurology was consulted and recommended CTA head and neck, which revealed evidence of communicating hydrocephalus.  Patient admitted to medicine for further workup of her symptoms and CTA findings. 73 yo F PMH DM type 2, HTN, HLD, GERD, hypothyroidism, asthma presenting for lightheadedness and unsteady gait.  Several months ago, patient began to experience gradual onset of dizziness and lightheadedness.  These symptoms begin after the patient stands up and last several minutes before spontaneously remitting.  This lightheadedness is asscociated with unsteadiness and a wobbly gait, requiring the patient to reach for handrails or other support while walking.  These symptoms, while not increased in severity, have increased in frequency over the past several months.  At the time of symptom onset, the patient went to her PCP, Dr. Bashir, who prescribed meclizine which did not relieve her symptoms.  At the onset of her symptoms, the patient also experienced an increase in urinary frequency which has been gradually worsening over the past several months.  The patient went to her PCP for these symptoms as well, who diagnosed her with an "infection," however did not prescribe antibiotics.  In addition to the above symptoms, the patient has also begun to experience a R sided occipital headache and intermittent "darkening of vision" over the past several months.  She also reports tingling in her toes b/l for several months.  The patient denies confusion, numbness/tingling in the extremities, peripheral edema, CP, SOB, abdominal pain, N/V, or changes in bowel habits.  The patient denies recent sick contacts, however arrived to the [place] from [facility] several months ago before the onset of her symptoms.  She has an unknown vaccination record.    In the ED, patient found to have labs significant for WBC count 12.6, negative U/A, and CXR showing no acute processes.  Neurology was consulted and recommended CTA head and neck, which revealed evidence of communicating hydrocephalus.  Patient admitted to medicine for further workup of her symptoms and CTA findings.

## 2019-12-29 NOTE — CONSULT NOTE ADULT - ASSESSMENT
Assessment: 74 year old RH F with a PMH of T2DM, HTN, HLD, GERD, hypothyroidism, asthma presenting with acute on chronic dizziness and unsteady gait. Patient has been having fatigue and dizziness (described as a "spinning" sensation) with resultant difficulty walking for the past year. Physical exam significant for positive Romberg, central head impulse test, and no skew deviation. Orthostatics positive (decrease in diastolic by 10). CTH without contrast shows disproportionate enlargement of the ventricular system from communicating hydrocephalus versus advanced volume loss.    Impression: Acute on chronic vertigo and gait instability, likely multifactorial orthostatic hypotension/autonomic neuropathy (T2DM), possible NPH (modified reese ratio >0.31)    Plan:  MRI brain without contrast  Management of orthostatic hypotension as per primary team (can consider fluid supplementation, adjustment of antihypertensives)   PT/OT consult  Consider neurosurgery consult depending on MRI  Hemoglobin A1c, Vitamin B12, TSH    Case to be discussed with neurology attending, Dr. Pérez

## 2019-12-29 NOTE — ED PROVIDER NOTE - PHYSICAL EXAMINATION
Gen: NAD, non-toxic, conversational, fatigue   Eyes: PERRLA, EOMI   HENT: Normocephalic, atraumatic. External ears normal, no rhinorrhea, moist mucous membranes.   CV: RRR, no M/R/G  Resp: CTAB, non-labored, speaking without difficulty on room air  Abd: soft, non tender, non rigid, no guarding or rebound tenderness  Skin: dry, wwp   Neuro: AOx3, speech is fluent and appropriate, wide based, magnetic gait, slow with movements but otherwise follows commands and has non focal neuro exam

## 2019-12-29 NOTE — H&P ADULT - ASSESSMENT
73 yo F PMH HTN, HLD, DM, hypothyroidism presenting with dizziness, lightheadedness, unsteady gait, and increased urinary frequency w/ head imaging findings concerning for NPH vs orthostatic hypotension vs anemia.

## 2019-12-30 LAB
ANION GAP SERPL CALC-SCNC: 13 MMO/L — SIGNIFICANT CHANGE UP (ref 7–14)
BACTERIA UR CULT: SIGNIFICANT CHANGE UP
BUN SERPL-MCNC: 9 MG/DL — SIGNIFICANT CHANGE UP (ref 7–23)
CALCIUM SERPL-MCNC: 9 MG/DL — SIGNIFICANT CHANGE UP (ref 8.4–10.5)
CHLORIDE SERPL-SCNC: 105 MMOL/L — SIGNIFICANT CHANGE UP (ref 98–107)
CO2 SERPL-SCNC: 23 MMOL/L — SIGNIFICANT CHANGE UP (ref 22–31)
CREAT SERPL-MCNC: 0.64 MG/DL — SIGNIFICANT CHANGE UP (ref 0.5–1.3)
FERRITIN SERPL-MCNC: 20.74 NG/ML — SIGNIFICANT CHANGE UP (ref 15–150)
GLUCOSE BLDC GLUCOMTR-MCNC: 130 MG/DL — HIGH (ref 70–99)
GLUCOSE SERPL-MCNC: 114 MG/DL — HIGH (ref 70–99)
HBA1C BLD-MCNC: 7.3 % — HIGH (ref 4–5.6)
HCT VFR BLD CALC: 34.3 % — LOW (ref 34.5–45)
HGB BLD-MCNC: 10.2 G/DL — LOW (ref 11.5–15.5)
IRON SATN MFR SERPL: 22 UG/DL — LOW (ref 30–160)
IRON SATN MFR SERPL: 355 UG/DL — SIGNIFICANT CHANGE UP (ref 140–530)
MAGNESIUM SERPL-MCNC: 2.1 MG/DL — SIGNIFICANT CHANGE UP (ref 1.6–2.6)
MCHC RBC-ENTMCNC: 23.9 PG — LOW (ref 27–34)
MCHC RBC-ENTMCNC: 29.7 % — LOW (ref 32–36)
MCV RBC AUTO: 80.5 FL — SIGNIFICANT CHANGE UP (ref 80–100)
NRBC # FLD: 0 K/UL — SIGNIFICANT CHANGE UP (ref 0–0)
PHOSPHATE SERPL-MCNC: 3.7 MG/DL — SIGNIFICANT CHANGE UP (ref 2.5–4.5)
PLATELET # BLD AUTO: 218 K/UL — SIGNIFICANT CHANGE UP (ref 150–400)
PMV BLD: 12 FL — SIGNIFICANT CHANGE UP (ref 7–13)
POTASSIUM SERPL-MCNC: 3.6 MMOL/L — SIGNIFICANT CHANGE UP (ref 3.5–5.3)
POTASSIUM SERPL-SCNC: 3.6 MMOL/L — SIGNIFICANT CHANGE UP (ref 3.5–5.3)
RBC # BLD: 4.26 M/UL — SIGNIFICANT CHANGE UP (ref 3.8–5.2)
RBC # FLD: 19.5 % — HIGH (ref 10.3–14.5)
RETICS #: 80 K/UL — SIGNIFICANT CHANGE UP (ref 25–125)
RETICS/RBC NFR: 1.9 % — SIGNIFICANT CHANGE UP (ref 0.5–2.5)
SODIUM SERPL-SCNC: 141 MMOL/L — SIGNIFICANT CHANGE UP (ref 135–145)
SPECIMEN SOURCE: SIGNIFICANT CHANGE UP
T4 AB SER-ACNC: 8.71 UG/DL — SIGNIFICANT CHANGE UP (ref 5.1–13)
TSH SERPL-MCNC: 4.13 UIU/ML — SIGNIFICANT CHANGE UP (ref 0.27–4.2)
UIBC SERPL-MCNC: 333.2 UG/DL — SIGNIFICANT CHANGE UP (ref 110–370)
VIT B12 SERPL-MCNC: 529 PG/ML — SIGNIFICANT CHANGE UP (ref 200–900)
WBC # BLD: 10.67 K/UL — HIGH (ref 3.8–10.5)
WBC # FLD AUTO: 10.67 K/UL — HIGH (ref 3.8–10.5)

## 2019-12-30 PROCEDURE — 99233 SBSQ HOSP IP/OBS HIGH 50: CPT | Mod: GC

## 2019-12-30 PROCEDURE — 70551 MRI BRAIN STEM W/O DYE: CPT | Mod: 26

## 2019-12-30 RX ORDER — SODIUM CHLORIDE 9 MG/ML
1000 INJECTION INTRAMUSCULAR; INTRAVENOUS; SUBCUTANEOUS
Refills: 0 | Status: DISCONTINUED | OUTPATIENT
Start: 2019-12-30 | End: 2019-12-31

## 2019-12-30 RX ADMIN — SODIUM CHLORIDE 75 MILLILITER(S): 9 INJECTION INTRAMUSCULAR; INTRAVENOUS; SUBCUTANEOUS at 14:57

## 2019-12-30 RX ADMIN — Medication 50 MICROGRAM(S): at 05:19

## 2019-12-30 RX ADMIN — MONTELUKAST 10 MILLIGRAM(S): 4 TABLET, CHEWABLE ORAL at 13:50

## 2019-12-30 RX ADMIN — FAMOTIDINE 20 MILLIGRAM(S): 10 INJECTION INTRAVENOUS at 13:51

## 2019-12-30 RX ADMIN — LOSARTAN POTASSIUM 50 MILLIGRAM(S): 100 TABLET, FILM COATED ORAL at 05:19

## 2019-12-30 RX ADMIN — ENOXAPARIN SODIUM 40 MILLIGRAM(S): 100 INJECTION SUBCUTANEOUS at 13:51

## 2019-12-30 RX ADMIN — SIMVASTATIN 20 MILLIGRAM(S): 20 TABLET, FILM COATED ORAL at 22:46

## 2019-12-30 RX ADMIN — Medication 1 TABLET(S): at 13:50

## 2019-12-30 RX ADMIN — Medication 325 MILLIGRAM(S): at 13:51

## 2019-12-30 NOTE — PROVIDER CONTACT NOTE (OTHER) - ACTION/TREATMENT ORDERED:
MD aware. No interventions at this time. Continue to monitor. MD aware.  iv hydration ordered for 12hrs. Continue to monitor.

## 2019-12-30 NOTE — PHYSICAL THERAPY INITIAL EVALUATION ADULT - PERTINENT HX OF CURRENT PROBLEM, REHAB EVAL
13 y.o. male, Minh Chapman, presents with Sore Throat (x 3 days    BIB [facility]) and cut on left calf (cut by glass x 2 weeks ago  )   Sore Throat  Patient complains of sore throat. Symptoms began 3 days ago. Pain is moderate. Fever is absent. Other associated symptoms have included cough last week. Fluid intake is good. There has not been contact with an individual with known strep. Current medications include ibuprofen.  He does have eczema on his face. Not using lotion. Had a steroid from dermatologist but is almost out. He cut his leg 2 weeks ago. Cut by glass. No pus or redness.     Review of Systems  Review of Systems   Constitutional: Negative for activity change, appetite change and fever.   HENT: Positive for sore throat. Negative for congestion and rhinorrhea.    Respiratory: Negative for cough, shortness of breath and wheezing.    Gastrointestinal: Negative for constipation, diarrhea, nausea and vomiting.   Genitourinary: Negative for decreased urine volume and difficulty urinating.   Musculoskeletal: Negative for arthralgias and myalgias.   Skin: Positive for rash.      Objective:   Physical Exam   Constitutional: He appears well-developed. He is active. No distress.   HENT:   Head: Normocephalic and atraumatic.   Right Ear: Tympanic membrane normal.   Left Ear: Tympanic membrane normal.   Nose: Nose normal.   Mouth/Throat: Mucous membranes are moist. Oropharyngeal exudate (PND) present. No pharynx erythema. Pharynx is abnormal (cobblestoning).   Eyes: Conjunctivae and lids are normal.   Cardiovascular: Normal rate, regular rhythm and S1 normal. Pulses are palpable.   No murmur heard.  Pulmonary/Chest: Effort normal and breath sounds normal. There is normal air entry. No respiratory distress. He has no wheezes.   Skin: Skin is warm. Capillary refill takes less than 2 seconds. Laceration (1.5cm oval laceration with central pink healing tissue. No exudate or erythema) and rash (dry skin with rough  patches on right cheek) noted.   Vitals reviewed.    Assessment:     13 y.o. male Minh FOREMAN was seen today for sore throat and cut on left calf.    Diagnoses and all orders for this visit:    Atopic dermatitis, unspecified type  -     hydrocortisone 2.5 % cream; Apply topically 2 (two) times daily. During eczema flare for 7 days    Laceration of left lower leg, initial encounter  -     mupirocin (BACTROBAN) 2 % ointment; Apply to affected area 3 times daily for 7 days    Sore throat    Postnasal drip      Plan:      1. Discussed that postnasal drip is likely cause of sore throat. Start using Flonase daily and continue Zyrtec. RTC if symptoms do not improve or worsens.  2. Discussed eczema action plan including OTC emollients 2-3x/day. Use steroid cream for 1 week during flares. RTC prn. Handout provided.   3. For leg laceration, discussed that it appears that it could have benefited from sutures at the time of injury but appears to be healing well without them. Will likely have a large scar. Use Bactroban on wound. RTC if redness or pus develops.    Patient is 74 year old female admitted with history of DM, HTN, HLD, GERD, hypothyroidism, asthma, presents with lightheadedness and unsteady gait.

## 2019-12-30 NOTE — PROGRESS NOTE ADULT - PROBLEM SELECTOR PLAN 3
Hg 10.3 on presentation  -unknown baseline and colonoscopy Hx  -c/w ferrous sulfate, no current indications of infection  -obtain colonoscopy Hx  -iron studies  -ferritin, TIBC, retic count  -CBC qd Hg 10.3 on presentation  -unknown baseline and colonoscopy Hx (concerning given report of hematochezia)  -c/w ferrous sulfate, no current indications of infection  -obtain colonoscopy Hx  -iron studies  -ferritin, TIBC, retic count  -CBC qd

## 2019-12-30 NOTE — PROVIDER CONTACT NOTE (OTHER) - ASSESSMENT
Pt asymptomatic, resting comfortably.
Pt asymptomatic, resting comfortably.
patient alert, verbalized dizziness on standing or ambulation
patient alert, verbalized dizziness on standing or ambulation

## 2019-12-30 NOTE — PROVIDER CONTACT NOTE (OTHER) - SITUATION
's
pt admitted with weakness/unsteady gaits, orthostatic assess and HR noted to be elevated.
pt admitted with weakness/unsteady gaits, orthostatic assess and HR noted to be elevated.

## 2019-12-30 NOTE — PROGRESS NOTE ADULT - PROBLEM SELECTOR PLAN 1
based on symptoms and CT findings, ddx includes hydrocephalus vs orthostatic hypotension 2/2 uncontrolled DM vs anemia vs cardiac  -U/A, CXR clear  -orthostatics showed borderline positive with tachycardia with standing  -per neuro, MRI brain w/ contrast, will follow their recs  -f/u vitamin B12  -f/u Hbg A1c  -f/u PT recs  -consider TTE if cardiac etiology concerning

## 2019-12-30 NOTE — PROGRESS NOTE ADULT - PROBLEM SELECTOR PLAN 2
based on CTH findings  associated w/ dizziness, unsteady gait, and lightheadedness --> concern for NPH  -per neuro, MRI brain  -f/u PT recs  -neuro check q4 hrs

## 2019-12-30 NOTE — PROVIDER CONTACT NOTE (OTHER) - RECOMMENDATIONS
Notify MD.
Notify MD.
assess patient and educated not to ambulated w/o assistance from staff members.
assess patient and educated not to ambulated w/o assistance from staff members.

## 2019-12-30 NOTE — PROVIDER CONTACT NOTE (OTHER) - BACKGROUND
Pt admitted for unsteady gait. PMH of DM2, asthma, GERD, HLD, and HTN.
Pt admitted for unsteady gait. PMH of DM2, asthma, GERD, HLD, and HTN.
patient with hx of DM
patient with hx of DM

## 2019-12-30 NOTE — PROGRESS NOTE ADULT - SUBJECTIVE AND OBJECTIVE BOX
Authored by Dr Demar Carr [phone] [facility]/ [id number] [facility]    Patient is a 74y old  Female who presents with a chief complaint of dizziness, unsteady gait (29 Dec 2019 17:02)    SUBJECTIVE / OVERNIGHT EVENTS:    No acute events overnight.  Patient reports same degree of lightheadedness/dizziness w/ standing, instability with walking.  Patient reports continued R occipital HA and intermittent vision changes.  Reports continued urinary frequency.    REVIEW OF SYSTEMS:    CONSTITUTIONAL: Denies weakness, fevers, chills  EYES/ENT: Denies throat pain   NECK: Denies pain, stiffness  RESPIRATORY: Denies cough, wheezing, hemoptysis; Denies shortness of breath  CARDIOVASCULAR: Denies chest pain or palpitations  GASTROINTESTINAL: Denies abdominal or epigastric pain. Denies nausea, vomiting, hematemesis, diarrhea, constipation, melena, or hematochezia  GENITOURINARY: Denies dysuria, hematuria  NEUROLOGICAL: Denies numbness or weakness  SKIN: Denies itching, rashes      MEDICATIONS  (STANDING):  dextrose 5%. 1000 milliLiter(s) (50 mL/Hr) IV Continuous <Continuous>  dextrose 50% Injectable 12.5 Gram(s) IV Push once  dextrose 50% Injectable 25 Gram(s) IV Push once  dextrose 50% Injectable 25 Gram(s) IV Push once  enoxaparin Injectable 40 milliGRAM(s) SubCutaneous daily  famotidine    Tablet 20 milliGRAM(s) Oral daily  ferrous    sulfate 325 milliGRAM(s) Oral daily  influenza   Vaccine 0.5 milliLiter(s) IntraMuscular once  insulin lispro (HumaLOG) corrective regimen sliding scale   SubCutaneous three times a day before meals  insulin lispro (HumaLOG) corrective regimen sliding scale   SubCutaneous at bedtime  levothyroxine 50 MICROGram(s) Oral daily  losartan 50 milliGRAM(s) Oral daily  montelukast 10 milliGRAM(s) Oral daily  multivitamin 1 Tablet(s) Oral daily  simvastatin 20 milliGRAM(s) Oral at bedtime    MEDICATIONS  (PRN):  dextrose 40% Gel 15 Gram(s) Oral once PRN Blood Glucose LESS THAN 70 milliGRAM(s)/deciliter  glucagon  Injectable 1 milliGRAM(s) IntraMuscular once PRN Glucose LESS THAN 70 milligrams/deciliter      Vital Signs Last 24 Hrs  T(C): 36.8 (30 Dec 2019 06:14), Max: 37.2 (29 Dec 2019 21:32)  T(F): 98.2 (30 Dec 2019 06:14), Max: 98.9 (29 Dec 2019 21:32)  HR: 99 (30 Dec 2019 06:14) (98 - 115)  BP: 125/68 (30 Dec 2019 06:14) (118/56 - 146/76)  BP(mean): --  RR: 16 (30 Dec 2019 06:14) (16 - 17)  SpO2: 98% (30 Dec 2019 06:14) (95% - 100%)  CAPILLARY BLOOD GLUCOSE      POCT Blood Glucose.: 222 mg/dL (29 Dec 2019 22:37)  POCT Blood Glucose.: 93 mg/dL (29 Dec 2019 17:58)  POCT Blood Glucose.: 268 mg/dL (29 Dec 2019 12:30)  POCT Blood Glucose.: 179 mg/dL (29 Dec 2019 08:36)  POCT Blood Glucose.: 188 mg/dL (29 Dec 2019 07:49)    I&O's Summary    29 Dec 2019 07:01  -  30 Dec 2019 07:00  --------------------------------------------------------  IN: 550 mL / OUT: 0 mL / NET: 550 mL        PHYSICAL EXAM  GENERAL: NAD, well-developed  EYES: conjunctiva and sclera clear  NECK: Supple, No JVD  ENT: MMM  CHEST/LUNG: Clear to auscultation bilaterally; No wheeze  HEART: Regular rate and rhythm; No murmurs  ABDOMEN: Soft, Nontender, Nondistended; Bowel sounds present  EXTREMITIES:  2+ Peripheral Pulses, No clubbing, cyanosis, or edema  NEURO: nonfocal, AOx3; wide-based, unsteady gait  PSYCH: calm   SKIN: No rashes or lesions    LABS:                        11.2   11.44 )-----------( 252      ( 29 Dec 2019 10:24 )             37.8         142  |  103  |  8   ----------------------------<  119<H>  3.9   |  26  |  0.65    Ca    9.7      29 Dec 2019 10:24  Phos  3.1       Mg     2.1         TPro  7.6  /  Alb  3.5  /  TBili  < 0.2<L>  /  DBili  x   /  AST  70<H>  /  ALT  41<H>  /  AlkPhos  79            Urinalysis Basic - ( 28 Dec 2019 09:55 )    Color: COLORLESS / Appearance: CLEAR / S.006 / pH: 6.5  Gluc: NEGATIVE / Ketone: NEGATIVE  / Bili: NEGATIVE / Urobili: NORMAL   Blood: TRACE / Protein: NEGATIVE / Nitrite: NEGATIVE   Leuk Esterase: TRACE / RBC: 0-2 / WBC 3-5   Sq Epi: OCC / Non Sq Epi: x / Bacteria: NEGATIVE          RADIOLOGY & ADDITIONAL TESTS:    Imaging Personally Reviewed:  Consultant(s) Notes Reviewed:    Care Discussed with Consultants/Other Providers: Authored by Dr Demar Carr [phone] [facility]/ [id number] [facility]    Patient is a 74y old  Female who presents with a chief complaint of dizziness, unsteady gait (29 Dec 2019 17:02)    SUBJECTIVE / OVERNIGHT EVENTS:    No acute events overnight.  Patient reports same degree of lightheadedness/dizziness w/ standing, instability with walking.  Patient reports continued R occipital HA and intermittent vision changes.  Reports continued urinary frequency.  Patient endorses bloody BM for 2 week duration.    REVIEW OF SYSTEMS:    CONSTITUTIONAL: Denies weakness, fevers, chills  EYES/ENT: Denies throat pain   NECK: Denies pain, stiffness  RESPIRATORY: Denies cough, wheezing, hemoptysis; Denies shortness of breath  CARDIOVASCULAR: Denies chest pain or palpitations  GASTROINTESTINAL: Denies abdominal or epigastric pain. Denies nausea, vomiting, hematemesis, diarrhea, constipation  GENITOURINARY: Denies dysuria, hematuria  NEUROLOGICAL: Denies numbness or weakness  SKIN: Denies itching, rashes      MEDICATIONS  (STANDING):  dextrose 5%. 1000 milliLiter(s) (50 mL/Hr) IV Continuous <Continuous>  dextrose 50% Injectable 12.5 Gram(s) IV Push once  dextrose 50% Injectable 25 Gram(s) IV Push once  dextrose 50% Injectable 25 Gram(s) IV Push once  enoxaparin Injectable 40 milliGRAM(s) SubCutaneous daily  famotidine    Tablet 20 milliGRAM(s) Oral daily  ferrous    sulfate 325 milliGRAM(s) Oral daily  influenza   Vaccine 0.5 milliLiter(s) IntraMuscular once  insulin lispro (HumaLOG) corrective regimen sliding scale   SubCutaneous three times a day before meals  insulin lispro (HumaLOG) corrective regimen sliding scale   SubCutaneous at bedtime  levothyroxine 50 MICROGram(s) Oral daily  losartan 50 milliGRAM(s) Oral daily  montelukast 10 milliGRAM(s) Oral daily  multivitamin 1 Tablet(s) Oral daily  simvastatin 20 milliGRAM(s) Oral at bedtime    MEDICATIONS  (PRN):  dextrose 40% Gel 15 Gram(s) Oral once PRN Blood Glucose LESS THAN 70 milliGRAM(s)/deciliter  glucagon  Injectable 1 milliGRAM(s) IntraMuscular once PRN Glucose LESS THAN 70 milligrams/deciliter      Vital Signs Last 24 Hrs  T(C): 36.8 (30 Dec 2019 06:14), Max: 37.2 (29 Dec 2019 21:32)  T(F): 98.2 (30 Dec 2019 06:14), Max: 98.9 (29 Dec 2019 21:32)  HR: 99 (30 Dec 2019 06:14) (98 - 115)  BP: 125/68 (30 Dec 2019 06:14) (118/56 - 146/76)  BP(mean): --  RR: 16 (30 Dec 2019 06:14) (16 - 17)  SpO2: 98% (30 Dec 2019 06:14) (95% - 100%)  CAPILLARY BLOOD GLUCOSE      POCT Blood Glucose.: 222 mg/dL (29 Dec 2019 22:37)  POCT Blood Glucose.: 93 mg/dL (29 Dec 2019 17:58)  POCT Blood Glucose.: 268 mg/dL (29 Dec 2019 12:30)  POCT Blood Glucose.: 179 mg/dL (29 Dec 2019 08:36)  POCT Blood Glucose.: 188 mg/dL (29 Dec 2019 07:49)    I&O's Summary    29 Dec 2019 07:01  -  30 Dec 2019 07:00  --------------------------------------------------------  IN: 550 mL / OUT: 0 mL / NET: 550 mL        PHYSICAL EXAM  GENERAL: NAD, well-developed  EYES: conjunctiva and sclera clear  NECK: Supple, No JVD  ENT: MMM  CHEST/LUNG: Clear to auscultation bilaterally; No wheeze  HEART: Regular rate and rhythm; No murmurs  ABDOMEN: Soft, Nontender, Nondistended; Bowel sounds present  EXTREMITIES:  2+ Peripheral Pulses, No clubbing, cyanosis, or edema  NEURO: nonfocal, AOx3; wide-based, unsteady gait  PSYCH: calm   SKIN: No rashes or lesions    LABS:                        11.2   11.44 )-----------( 252      ( 29 Dec 2019 10:24 )             37.8         142  |  103  |  8   ----------------------------<  119<H>  3.9   |  26  |  0.65    Ca    9.7      29 Dec 2019 10:24  Phos  3.1       Mg     2.1         TPro  7.6  /  Alb  3.5  /  TBili  < 0.2<L>  /  DBili  x   /  AST  70<H>  /  ALT  41<H>  /  AlkPhos  79            Urinalysis Basic - ( 28 Dec 2019 09:55 )    Color: COLORLESS / Appearance: CLEAR / S.006 / pH: 6.5  Gluc: NEGATIVE / Ketone: NEGATIVE  / Bili: NEGATIVE / Urobili: NORMAL   Blood: TRACE / Protein: NEGATIVE / Nitrite: NEGATIVE   Leuk Esterase: TRACE / RBC: 0-2 / WBC 3-5   Sq Epi: OCC / Non Sq Epi: x / Bacteria: NEGATIVE          RADIOLOGY & ADDITIONAL TESTS:    Imaging Personally Reviewed:  Consultant(s) Notes Reviewed:    Care Discussed with Consultants/Other Providers:

## 2019-12-31 VITALS
RESPIRATION RATE: 18 BRPM | DIASTOLIC BLOOD PRESSURE: 80 MMHG | HEART RATE: 105 BPM | TEMPERATURE: 99 F | OXYGEN SATURATION: 100 % | SYSTOLIC BLOOD PRESSURE: 133 MMHG

## 2019-12-31 DIAGNOSIS — Z02.9 ENCOUNTER FOR ADMINISTRATIVE EXAMINATIONS, UNSPECIFIED: ICD-10-CM

## 2019-12-31 LAB
ANION GAP SERPL CALC-SCNC: 13 MMO/L — SIGNIFICANT CHANGE UP (ref 7–14)
BUN SERPL-MCNC: 10 MG/DL — SIGNIFICANT CHANGE UP (ref 7–23)
CALCIUM SERPL-MCNC: 9.1 MG/DL — SIGNIFICANT CHANGE UP (ref 8.4–10.5)
CHLORIDE SERPL-SCNC: 106 MMOL/L — SIGNIFICANT CHANGE UP (ref 98–107)
CO2 SERPL-SCNC: 21 MMOL/L — LOW (ref 22–31)
CREAT SERPL-MCNC: 0.65 MG/DL — SIGNIFICANT CHANGE UP (ref 0.5–1.3)
GLUCOSE SERPL-MCNC: 123 MG/DL — HIGH (ref 70–99)
HCT VFR BLD CALC: 34.5 % — SIGNIFICANT CHANGE UP (ref 34.5–45)
HGB BLD-MCNC: 10.2 G/DL — LOW (ref 11.5–15.5)
MAGNESIUM SERPL-MCNC: 2 MG/DL — SIGNIFICANT CHANGE UP (ref 1.6–2.6)
MCHC RBC-ENTMCNC: 23.6 PG — LOW (ref 27–34)
MCHC RBC-ENTMCNC: 29.6 % — LOW (ref 32–36)
MCV RBC AUTO: 79.9 FL — LOW (ref 80–100)
NRBC # FLD: 0 K/UL — SIGNIFICANT CHANGE UP (ref 0–0)
PHOSPHATE SERPL-MCNC: 3.8 MG/DL — SIGNIFICANT CHANGE UP (ref 2.5–4.5)
PLATELET # BLD AUTO: 229 K/UL — SIGNIFICANT CHANGE UP (ref 150–400)
PMV BLD: 11.6 FL — SIGNIFICANT CHANGE UP (ref 7–13)
POTASSIUM SERPL-MCNC: 3.7 MMOL/L — SIGNIFICANT CHANGE UP (ref 3.5–5.3)
POTASSIUM SERPL-SCNC: 3.7 MMOL/L — SIGNIFICANT CHANGE UP (ref 3.5–5.3)
RBC # BLD: 4.32 M/UL — SIGNIFICANT CHANGE UP (ref 3.8–5.2)
RBC # FLD: 19.3 % — HIGH (ref 10.3–14.5)
SODIUM SERPL-SCNC: 140 MMOL/L — SIGNIFICANT CHANGE UP (ref 135–145)
WBC # BLD: 10.81 K/UL — HIGH (ref 3.8–10.5)
WBC # FLD AUTO: 10.81 K/UL — HIGH (ref 3.8–10.5)

## 2019-12-31 PROCEDURE — 99239 HOSP IP/OBS DSCHRG MGMT >30: CPT | Mod: GC

## 2019-12-31 RX ORDER — HYDROXYZINE HCL 10 MG
1 TABLET ORAL
Qty: 0 | Refills: 0 | DISCHARGE

## 2019-12-31 RX ORDER — METFORMIN HYDROCHLORIDE 850 MG/1
1 TABLET ORAL
Qty: 60 | Refills: 3
Start: 2019-12-31 | End: 2020-04-28

## 2019-12-31 RX ORDER — METFORMIN HYDROCHLORIDE 850 MG/1
1 TABLET ORAL
Qty: 0 | Refills: 0 | DISCHARGE

## 2019-12-31 RX ORDER — METFORMIN HYDROCHLORIDE 850 MG/1
1 TABLET ORAL
Qty: 60 | Refills: 0
Start: 2019-12-31 | End: 2020-01-29

## 2019-12-31 RX ADMIN — Medication 1 TABLET(S): at 11:07

## 2019-12-31 RX ADMIN — LOSARTAN POTASSIUM 50 MILLIGRAM(S): 100 TABLET, FILM COATED ORAL at 06:17

## 2019-12-31 RX ADMIN — Medication 4: at 08:50

## 2019-12-31 RX ADMIN — Medication 50 MICROGRAM(S): at 06:17

## 2019-12-31 RX ADMIN — MONTELUKAST 10 MILLIGRAM(S): 4 TABLET, CHEWABLE ORAL at 11:07

## 2019-12-31 RX ADMIN — Medication 325 MILLIGRAM(S): at 11:08

## 2019-12-31 RX ADMIN — FAMOTIDINE 20 MILLIGRAM(S): 10 INJECTION INTRAVENOUS at 11:07

## 2019-12-31 RX ADMIN — ENOXAPARIN SODIUM 40 MILLIGRAM(S): 100 INJECTION SUBCUTANEOUS at 11:07

## 2019-12-31 NOTE — DISCHARGE NOTE NURSING/CASE MANAGEMENT/SOCIAL WORK - PATIENT PORTAL LINK FT
You can access the FollowMyHealth Patient Portal offered by [facility] by registering at the following website: [email or web address]. By joining [employer] [facility]’s FollowMyHealth portal, you will also be able to view your health information using other applications (apps) compatible with our system.

## 2019-12-31 NOTE — PROGRESS NOTE ADULT - PROBLEM SELECTOR PLAN 9
DVT PPX: lovenox qd  Diet: regular, card consistent  PCP: Dr. Malcolm Bashir ([phone])
DVT PPX: lovenox qd  Diet: regular, card consistent  PCP: Dr. Malcolm Bashir ([phone])
DVT PPX: lovenox qd  Diet: regular, card consistent  PCP: Dr. Malcolm Bashir ([phone])

## 2019-12-31 NOTE — DISCHARGE NOTE NURSING/CASE MANAGEMENT/SOCIAL WORK - NSDCFUADDAPPT_GEN_ALL_CORE_FT
-please follow up with primary care doctor on January 8th at 12 PM  -please follow up with neurology as outpatient (can call [phone] for [facility])

## 2019-12-31 NOTE — PROGRESS NOTE ADULT - PROBLEM SELECTOR PLAN 6
-c/w home montelukast, hold home hydroxyzine

## 2019-12-31 NOTE — PROGRESS NOTE ADULT - ASSESSMENT
75 yo F PMH HTN, HLD, DM, hypothyroidism presenting with dizziness, lightheadedness, unsteady gait, and increased urinary frequency w/ head imaging findings concerning for NPH vs orthostatic hypotension vs anemia.

## 2019-12-31 NOTE — PROGRESS NOTE ADULT - PROBLEM SELECTOR PLAN 3
Hg 10.3 on presentation  -unknown baseline and colonoscopy Hx (concerning given report of hematochezia)  -c/w ferrous sulfate, no current indications of infection  -obtain colonoscopy Hx  -iron studies  -ferritin, TIBC, retic count  -CBC qd

## 2019-12-31 NOTE — PROGRESS NOTE ADULT - ASSESSMENT
75 yo F PMH HTN, HLD, DM, hypothyroidism presenting with dizziness, lightheadedness, unsteady gait, and increased urinary frequency w/ head imaging findings concerning for NPH vs orthostatic hypotension vs anemia. 75 yo F PMH HTN, HLD, DM, hypothyroidism presenting with dizziness, lightheadedness, unsteady gait, and increased urinary frequency w/ head imaging findings concerning for NPH vs orthostatic hypotension.

## 2019-12-31 NOTE — CHART NOTE - NSCHARTNOTEFT_GEN_A_CORE
MRI reviewed. Pt does not have the clinical criteria for NPH, which is a clinical diagnosis. MRI does not show any lesions that would cause hydrocephalus. Can follow up with outpatient neurology for further monitoring of neurological status, can call [phone] for [facility] appointment. MRI reviewed. Pt does not have clear clinical criteria for NPH, which is a clinical diagnosis. MRI does not show any lesions that would cause hydrocephalus. Can follow up with outpatient neurology for further monitoring of neurological status, can call [phone] for [facility] appointment.

## 2019-12-31 NOTE — PROGRESS NOTE ADULT - PROBLEM SELECTOR PLAN 10
Transitions of Care Status:  1.  Name of PCP:  2.  PCP Contacted on Admission: [ ] Y    [ ] N    3.  PCP contacted at Discharge: [ ] Y    [ ] N    [ ] N/A  4.  Post-Discharge Appointment Date and Location:  5.  Summary of Handoff given to PCP: Transitions of Care Status:  1.  Name of PCP:  2.  PCP Contacted on Admission: [ ] Y    [ ] N    3.  PCP contacted at Discharge: [X ] Y    [ ] N    [ ] N/A  4.  Post-Discharge Appointment Date and Location:  5.  Summary of Handoff given to PCP: yes

## 2019-12-31 NOTE — PROGRESS NOTE ADULT - SUBJECTIVE AND OBJECTIVE BOX
Authored by Dr Demar Carr [phone] [facility]/ [id number] [facility]    Patient is a 74y old  Female who presents with a chief complaint of dizziness, unsteady gait (31 Dec 2019 07:07)    SUBJECTIVE / OVERNIGHT EVENTS:    No acute events overnight.  Patient endorses continued lightheadedness/dizziness with standing.  Patient still endorses unsteady gait with ambulation.  Patient endorses continued urinary frequency.  Patient unsure if continued blood in BM.    REVIEW OF SYSTEMS:    CONSTITUTIONAL: Denies weakness, fevers, chills  EYES/ENT: Denies throat pain   NECK: Denies pain, stiffness  RESPIRATORY: Denies cough, wheezing, hemoptysis; Denies shortness of breath  CARDIOVASCULAR: Denies chest pain or palpitations  GASTROINTESTINAL: Denies abdominal or epigastric pain. Denies nausea, vomiting, hematemesis, diarrhea, constipation  GENITOURINARY: Denies dysuria, hematuria  NEUROLOGICAL: Denies numbness or weakness  SKIN: Denies itching, rashes      MEDICATIONS  (STANDING):  dextrose 5%. 1000 milliLiter(s) (50 mL/Hr) IV Continuous <Continuous>  dextrose 50% Injectable 12.5 Gram(s) IV Push once  dextrose 50% Injectable 25 Gram(s) IV Push once  dextrose 50% Injectable 25 Gram(s) IV Push once  enoxaparin Injectable 40 milliGRAM(s) SubCutaneous daily  famotidine    Tablet 20 milliGRAM(s) Oral daily  ferrous    sulfate 325 milliGRAM(s) Oral daily  influenza   Vaccine 0.5 milliLiter(s) IntraMuscular once  insulin lispro (HumaLOG) corrective regimen sliding scale   SubCutaneous three times a day before meals  insulin lispro (HumaLOG) corrective regimen sliding scale   SubCutaneous at bedtime  levothyroxine 50 MICROGram(s) Oral daily  losartan 50 milliGRAM(s) Oral daily  montelukast 10 milliGRAM(s) Oral daily  multivitamin 1 Tablet(s) Oral daily  simvastatin 20 milliGRAM(s) Oral at bedtime  sodium chloride 0.9%. 1000 milliLiter(s) (75 mL/Hr) IV Continuous <Continuous>    MEDICATIONS  (PRN):  dextrose 40% Gel 15 Gram(s) Oral once PRN Blood Glucose LESS THAN 70 milliGRAM(s)/deciliter  glucagon  Injectable 1 milliGRAM(s) IntraMuscular once PRN Glucose LESS THAN 70 milligrams/deciliter      Vital Signs Last 24 Hrs  T(C): 36.7 (31 Dec 2019 06:13), Max: 36.7 (30 Dec 2019 21:30)  T(F): 98 (31 Dec 2019 06:13), Max: 98 (30 Dec 2019 21:30)  HR: 96 (31 Dec 2019 06:13) (96 - 122)  BP: 143/79 (31 Dec 2019 06:13) (131/85 - 143/79)  BP(mean): --  RR: 18 (31 Dec 2019 06:13) (17 - 18)  SpO2: 99% (31 Dec 2019 06:13) (99% - 100%)  CAPILLARY BLOOD GLUCOSE      POCT Blood Glucose.: 181 mg/dL (30 Dec 2019 22:18)  POCT Blood Glucose.: 108 mg/dL (30 Dec 2019 18:00)  POCT Blood Glucose.: 130 mg/dL (30 Dec 2019 13:05)  POCT Blood Glucose.: 130 mg/dL (30 Dec 2019 08:34)    I&O's Summary    30 Dec 2019 07:01  -  31 Dec 2019 07:00  --------------------------------------------------------  IN: 950 mL / OUT: 0 mL / NET: 950 mL        PHYSICAL EXAM  GENERAL: NAD, well-developed  EYES: conjunctiva and sclera clear  NECK: Supple, No JVD  ENT: MMM  CHEST/LUNG: Clear to auscultation bilaterally; No wheeze  HEART: Regular rate and rhythm; No murmurs  ABDOMEN: Soft, Nontender, Nondistended; Bowel sounds present  EXTREMITIES:  2+ Peripheral Pulses, No clubbing, cyanosis, or edema  NEURO: nonfocal, AOx3; wide based, unsteady gait  PSYCH: calm   SKIN: No rashes or lesions    LABS:                        10.2   10.81 )-----------( 229      ( 31 Dec 2019 05:30 )             34.5     12-31    140  |  106  |  10  ----------------------------<  123<H>  3.7   |  21<L>  |  0.65    Ca    9.1      31 Dec 2019 05:30  Phos  3.8     12-31  Mg     2.0     12-31                Culture - Urine (collected 28 Dec 2019 22:18)  Source: URINE MIDSTREAM  Final Report (30 Dec 2019 09:51):    Culture grew 3 or more types of organisms which indicate    collection contamination; consider recollection only if    clinically indicated.        RADIOLOGY & ADDITIONAL TESTS:    Imaging Personally Reviewed:  Consultant(s) Notes Reviewed:    Care Discussed with Consultants/Other Providers: Authored by Dr Demar Carr [phone] [facility]/ [id number] [facility]    Patient is a 74y old  Female who presents with a chief complaint of dizziness, unsteady gait (31 Dec 2019 07:07)    SUBJECTIVE / OVERNIGHT EVENTS:    No acute events overnight.  Patient endorses continued lightheadedness/dizziness with standing.  Patient still endorses unsteady gait with ambulation.  Patient endorses continued urinary frequency.  Patient unsure if continued blood in BM.    REVIEW OF SYSTEMS:    CONSTITUTIONAL: Denies weakness, fevers, chills  EYES/ENT: Denies throat pain   NECK: Denies pain, stiffness  RESPIRATORY: Denies cough, wheezing, hemoptysis; Denies shortness of breath  CARDIOVASCULAR: Denies chest pain or palpitations  GASTROINTESTINAL: Denies abdominal or epigastric pain. Denies nausea, vomiting, hematemesis, diarrhea, constipation  GENITOURINARY: Denies dysuria, hematuria  NEUROLOGICAL: Denies numbness or weakness  SKIN: Denies itching, rashes      MEDICATIONS  (STANDING):  dextrose 5%. 1000 milliLiter(s) (50 mL/Hr) IV Continuous <Continuous>  dextrose 50% Injectable 12.5 Gram(s) IV Push once  dextrose 50% Injectable 25 Gram(s) IV Push once  dextrose 50% Injectable 25 Gram(s) IV Push once  enoxaparin Injectable 40 milliGRAM(s) SubCutaneous daily  famotidine    Tablet 20 milliGRAM(s) Oral daily  ferrous    sulfate 325 milliGRAM(s) Oral daily  influenza   Vaccine 0.5 milliLiter(s) IntraMuscular once  insulin lispro (HumaLOG) corrective regimen sliding scale   SubCutaneous three times a day before meals  insulin lispro (HumaLOG) corrective regimen sliding scale   SubCutaneous at bedtime  levothyroxine 50 MICROGram(s) Oral daily  losartan 50 milliGRAM(s) Oral daily  montelukast 10 milliGRAM(s) Oral daily  multivitamin 1 Tablet(s) Oral daily  simvastatin 20 milliGRAM(s) Oral at bedtime  sodium chloride 0.9%. 1000 milliLiter(s) (75 mL/Hr) IV Continuous <Continuous>    MEDICATIONS  (PRN):  dextrose 40% Gel 15 Gram(s) Oral once PRN Blood Glucose LESS THAN 70 milliGRAM(s)/deciliter  glucagon  Injectable 1 milliGRAM(s) IntraMuscular once PRN Glucose LESS THAN 70 milligrams/deciliter      Vital Signs Last 24 Hrs  T(C): 36.7 (31 Dec 2019 06:13), Max: 36.7 (30 Dec 2019 21:30)  T(F): 98 (31 Dec 2019 06:13), Max: 98 (30 Dec 2019 21:30)  HR: 96 (31 Dec 2019 06:13) (96 - 122)  BP: 143/79 (31 Dec 2019 06:13) (131/85 - 143/79)  BP(mean): --  RR: 18 (31 Dec 2019 06:13) (17 - 18)  SpO2: 99% (31 Dec 2019 06:13) (99% - 100%)  CAPILLARY BLOOD GLUCOSE      POCT Blood Glucose.: 181 mg/dL (30 Dec 2019 22:18)  POCT Blood Glucose.: 108 mg/dL (30 Dec 2019 18:00)  POCT Blood Glucose.: 130 mg/dL (30 Dec 2019 13:05)  POCT Blood Glucose.: 130 mg/dL (30 Dec 2019 08:34)    I&O's Summary    30 Dec 2019 07:01  -  31 Dec 2019 07:00  --------------------------------------------------------  IN: 950 mL / OUT: 0 mL / NET: 950 mL        PHYSICAL EXAM  GENERAL: NAD, well-developed  EYES: conjunctiva and sclera clear  NECK: Supple, No JVD  ENT: MMM  CHEST/LUNG: Clear to auscultation bilaterally; No wheeze  HEART: Regular rate and rhythm; No murmurs  ABDOMEN: Soft, Nontender, Nondistended; Bowel sounds present  EXTREMITIES:  2+ Peripheral Pulses, No clubbing, cyanosis, or edema  NEURO: nonfocal, AOx3; narrow based, unsteady gait  PSYCH: calm   SKIN: No rashes or lesions    LABS:                        10.2   10.81 )-----------( 229      ( 31 Dec 2019 05:30 )             34.5     12-31    140  |  106  |  10  ----------------------------<  123<H>  3.7   |  21<L>  |  0.65    Ca    9.1      31 Dec 2019 05:30  Phos  3.8     12-31  Mg     2.0     12-31                Culture - Urine (collected 28 Dec 2019 22:18)  Source: URINE MIDSTREAM  Final Report (30 Dec 2019 09:51):    Culture grew 3 or more types of organisms which indicate    collection contamination; consider recollection only if    clinically indicated.        RADIOLOGY & ADDITIONAL TESTS:    Imaging Personally Reviewed:  Consultant(s) Notes Reviewed:    Care Discussed with Consultants/Other Providers: Authored by Dr Demar Carr [phone] [facility]/ [id number] [facility]    Patient is a 74y old  Female who presents with a chief complaint of dizziness, unsteady gait (31 Dec 2019 07:07)    SUBJECTIVE / OVERNIGHT EVENTS:    No acute events overnight.  Patient endorses continued lightheadedness/dizziness with standing.  Patient still endorses unsteady gait with ambulation.  Patient endorses continued urinary frequency.  Patient unsure if continued blood in BM.    REVIEW OF SYSTEMS:  CONSTITUTIONAL: Denies weakness, fevers, chills  EYES/ENT: Denies throat pain   NECK: Denies pain, stiffness  RESPIRATORY: Denies cough, wheezing, hemoptysis; Denies shortness of breath  CARDIOVASCULAR: Denies chest pain or palpitations  GASTROINTESTINAL: Denies abdominal or epigastric pain. Denies nausea, vomiting, hematemesis, diarrhea, constipation  GENITOURINARY: Denies dysuria, hematuria  NEUROLOGICAL: Denies numbness or weakness  SKIN: Denies itching, rashes    MEDICATIONS  (STANDING):  enoxaparin Injectable 40 milliGRAM(s) SubCutaneous daily  famotidine    Tablet 20 milliGRAM(s) Oral daily  ferrous    sulfate 325 milliGRAM(s) Oral daily  influenza   Vaccine 0.5 milliLiter(s) IntraMuscular once  insulin lispro (HumaLOG) corrective regimen sliding scale   SubCutaneous three times a day before meals  insulin lispro (HumaLOG) corrective regimen sliding scale   SubCutaneous at bedtime  levothyroxine 50 MICROGram(s) Oral daily  losartan 50 milliGRAM(s) Oral daily  montelukast 10 milliGRAM(s) Oral daily  multivitamin 1 Tablet(s) Oral daily  simvastatin 20 milliGRAM(s) Oral at bedtime  sodium chloride 0.9%. 1000 milliLiter(s) (75 mL/Hr) IV Continuous <Continuous>    MEDICATIONS  (PRN):  dextrose 40% Gel 15 Gram(s) Oral once PRN Blood Glucose LESS THAN 70 milliGRAM(s)/deciliter  glucagon  Injectable 1 milliGRAM(s) IntraMuscular once PRN Glucose LESS THAN 70 milligrams/deciliter    Vital Signs Last 24 Hrs  T(C): 36.7 (31 Dec 2019 06:13), Max: 36.7 (30 Dec 2019 21:30)  T(F): 98 (31 Dec 2019 06:13), Max: 98 (30 Dec 2019 21:30)  HR: 96 (31 Dec 2019 06:13) (96 - 122)  BP: 143/79 (31 Dec 2019 06:13) (131/85 - 143/79)  BP(mean): --  RR: 18 (31 Dec 2019 06:13) (17 - 18)  SpO2: 99% (31 Dec 2019 06:13) (99% - 100%)    CAPILLARY BLOOD GLUCOSE  POCT Blood Glucose.: 181 mg/dL (30 Dec 2019 22:18)  POCT Blood Glucose.: 108 mg/dL (30 Dec 2019 18:00)  POCT Blood Glucose.: 130 mg/dL (30 Dec 2019 13:05)  POCT Blood Glucose.: 130 mg/dL (30 Dec 2019 08:34)    I&O's Summary    30 Dec 2019 07:01  -  31 Dec 2019 07:00  --------------------------------------------------------  IN: 950 mL / OUT: 0 mL / NET: 950 mL    PHYSICAL EXAM  GENERAL: NAD, well-developed  EYES: conjunctiva and sclera clear  NECK: Supple, No JVD  ENT: MMM  CHEST/LUNG: Clear to auscultation bilaterally; No wheeze  HEART: Regular rate and rhythm; No murmurs  ABDOMEN: Soft, Nontender, Nondistended; Bowel sounds present  EXTREMITIES:  2+ Peripheral Pulses, No clubbing, cyanosis, or edema  NEURO: nonfocal, AOx3; narrow based, unsteady gait  PSYCH: calm   SKIN: No rashes or lesions    LABS:                        10.2   10.81 )-----------( 229      ( 31 Dec 2019 05:30 )             34.5     12-31    140  |  106  |  10  ----------------------------<  123<H>  3.7   |  21<L>  |  0.65    Ca    9.1      31 Dec 2019 05:30  Phos  3.8     12-31  Mg     2.0     12-31      Culture - Urine (collected 28 Dec 2019 22:18)  Source: URINE MIDSTREAM  Final Report (30 Dec 2019 09:51):    Culture grew 3 or more types of organisms which indicate    collection contamination; consider recollection only if    clinically indicated.    RADIOLOGY & ADDITIONAL TESTS:    Imaging Personally Reviewed:  Consultant(s) Notes Reviewed:  neurology   Care Discussed with Consultants/Other Providers:

## 2019-12-31 NOTE — PROGRESS NOTE ADULT - ATTENDING COMMENTS
Patient seen and examined by myself , case discussed  with resident ,agree with the above finding and plan  73 yo F PMH DM type 2, HTN, HLD, GERD, hypothyroidism, asthma presented  with lightheadedness and unsteady gait and increased urinary frequency   head imaging findings concerning for NPH , Neuro eval appreciated, will check MRI brain non con   Orthostatic Hypotension resolved  PT/OT
Patient seen and examined by myself , case discussed  with resident.    73 yo F PMH DM type 2 on orals, HTN, HLD, GERD, hypothyroidism, asthma presented  with lightheadedness and unsteady gait and increased urinary frequency  - dizziness unlikely central given normal MRI (no CVA or other lesion)  - hydrocephalus on imaging however neurology not convinced this is NPH--OP neurology f/u  - DM2 increase metformin to 500 mg BID on dc  - BP controlled  - GI bleeding, no events here, H/H at baseline, family unclear if pt has undergone routine c-scope, OP f/u with PCP forc-scope arrangements    Home PT, rolling walker  Care d/w daughter and grandson, questions answered  dispo planning, home with family  dispo time 35 min

## 2019-12-31 NOTE — PROGRESS NOTE ADULT - PROBLEM SELECTOR PLAN 1
based on symptoms and CT findings, ddx includes hydrocephalus vs orthostatic hypotension 2/2 uncontrolled DM vs anemia vs cardiac  -U/A, CXR clear  -orthostatics repeatedly negative  -per neuro, MRI brain w/ contrast, will follow their recs  -consider TTE if cardiac etiology concerning based on symptoms and CT findings, ddx includes hydrocephalus vs orthostatic hypotension 2/2 uncontrolled DM vs anemia vs cardiac  -U/A, CXR clear  -orthostatics repeatedly negative  -MRI w/o con --> likely NPH  -consider TTE if cardiac etiology concerning based on symptoms and CT findings, ddx includes hydrocephalus vs orthostatic hypotension 2/2 uncontrolled DM vs anemia vs cardiac  -U/A, CXR clear  -orthostatics repeatedly negative  -MRI w/o con --> likely NPH, however neuro says not NPH b/c does not meet clinical criteria (gait narrow based, dizziness not associated w/ NPH)  -consider TTE if cardiac etiology concerning Unclear cause, some component of orthostasis, MRI negative for central lesion or CVA etc to explain symptoms  -U/A, CXR clear  -orthostatics repeatedly negative  -MRI w/o con --> likely NPH, however neuro says not NPH b/c does not meet clinical criteria (gait narrow based, dizziness not associated w/ NPH)  - educated on slow rising from sitting

## 2019-12-31 NOTE — PROGRESS NOTE ADULT - SUBJECTIVE AND OBJECTIVE BOX
PROGRESS NOTE:     Patient is a 74y old  Female who presents with a chief complaint of dizziness, unsteady gait (30 Dec 2019 07:41)      SUBJECTIVE / OVERNIGHT EVENTS:    ADDITIONAL REVIEW OF SYSTEMS:    MEDICATIONS  (STANDING):  dextrose 5%. 1000 milliLiter(s) (50 mL/Hr) IV Continuous <Continuous>  dextrose 50% Injectable 12.5 Gram(s) IV Push once  dextrose 50% Injectable 25 Gram(s) IV Push once  dextrose 50% Injectable 25 Gram(s) IV Push once  enoxaparin Injectable 40 milliGRAM(s) SubCutaneous daily  famotidine    Tablet 20 milliGRAM(s) Oral daily  ferrous    sulfate 325 milliGRAM(s) Oral daily  influenza   Vaccine 0.5 milliLiter(s) IntraMuscular once  insulin lispro (HumaLOG) corrective regimen sliding scale   SubCutaneous three times a day before meals  insulin lispro (HumaLOG) corrective regimen sliding scale   SubCutaneous at bedtime  levothyroxine 50 MICROGram(s) Oral daily  losartan 50 milliGRAM(s) Oral daily  montelukast 10 milliGRAM(s) Oral daily  multivitamin 1 Tablet(s) Oral daily  simvastatin 20 milliGRAM(s) Oral at bedtime  sodium chloride 0.9%. 1000 milliLiter(s) (75 mL/Hr) IV Continuous <Continuous>    MEDICATIONS  (PRN):  dextrose 40% Gel 15 Gram(s) Oral once PRN Blood Glucose LESS THAN 70 milliGRAM(s)/deciliter  glucagon  Injectable 1 milliGRAM(s) IntraMuscular once PRN Glucose LESS THAN 70 milligrams/deciliter      CAPILLARY BLOOD GLUCOSE      POCT Blood Glucose.: 181 mg/dL (30 Dec 2019 22:18)  POCT Blood Glucose.: 108 mg/dL (30 Dec 2019 18:00)  POCT Blood Glucose.: 130 mg/dL (30 Dec 2019 13:05)  POCT Blood Glucose.: 130 mg/dL (30 Dec 2019 08:34)    I&O's Summary    30 Dec 2019 07:01  -  31 Dec 2019 07:00  --------------------------------------------------------  IN: 950 mL / OUT: 0 mL / NET: 950 mL        PHYSICAL EXAM:  Vital Signs Last 24 Hrs  T(C): 36.7 (31 Dec 2019 06:13), Max: 36.7 (30 Dec 2019 21:30)  T(F): 98 (31 Dec 2019 06:13), Max: 98 (30 Dec 2019 21:30)  HR: 96 (31 Dec 2019 06:13) (96 - 122)  BP: 143/79 (31 Dec 2019 06:13) (131/85 - 143/79)  BP(mean): --  RR: 18 (31 Dec 2019 06:13) (17 - 18)  SpO2: 99% (31 Dec 2019 06:13) (99% - 100%)    CONSTITUTIONAL: NAD, well-developedlungs are clear to auscultation bilaterally  CARDIOVASCULAR: Regular rate and rhythm. Normal S1 and S2. No murmurs.  RESPIRATORY: Normal respiratory effort, CTAB  EXTREMITIES: No CARLOS, peripheral pulses intact.  ABDOMEN: Nontender to palpation, normoactive bowel sounds, no rebound/guarding; No hepatosplenomegaly  MUSCLOSKELETAL: no clubbing or cyanosis of digits; no joint swelling or tenderness to palpation  PSYCH: A+O to person, place, and time; affect appropriate    LABS:                        10.2   10.81 )-----------( 229      ( 31 Dec 2019 05:30 )             34.5     12-31    140  |  106  |  10  ----------------------------<  123<H>  3.7   |  21<L>  |  0.65    Ca    9.1      31 Dec 2019 05:30  Phos  3.8     12-31  Mg     2.0     12-31                Culture - Urine (collected 28 Dec 2019 22:18)  Source: URINE MIDSTREAM  Final Report (30 Dec 2019 09:51):    Culture grew 3 or more types of organisms which indicate    collection contamination; consider recollection only if    clinically indicated.        RADIOLOGY & ADDITIONAL TESTS:  Results Reviewed:   Imaging Personally Reviewed:  Electrocardiogram Personally Reviewed:    COORDINATION OF CARE:  Care Discussed with Consultants/Other Providers [Y/N]:  Prior or Outpatient Records Reviewed [Y/N]:

## 2019-12-31 NOTE — PROGRESS NOTE ADULT - PROBLEM SELECTOR PLAN 3
Hg 10.3 on presentation  -per PMD, never performed colonoscopy b/c negative FOBT at office  -c/w ferrous sulfate, no current indications of infection  -iron studies  -ferritin, TIBC, retic count  -CBC qd

## 2019-12-31 NOTE — PROGRESS NOTE ADULT - PROBLEM SELECTOR PLAN 2
based on CTH findings  associated w/ dizziness, unsteady gait, and lightheadedness --> concern for NPH  -per neuro, MRI brain  -neuro check q4 hrs based on CTH findings  associated w/ dizziness, unsteady gait, and lightheadedness --> concern for NPH  -MRI showed NPH --> f/u neuro recs  -neuro check q4 hrs based on CTH findings  associated w/ dizziness, unsteady gait, and lightheadedness --> concern for NPH  -MRI showed NPH --> per neuro, not NPH b/c does not meet clinical criteria  -neuro check q4 hrs Concern for such based on imaging and associated  unsteady gait and urinary complaints --> concern for NPH  -MRI showed NPH --> per neuro, not NPH b/c does not meet clinical criteria

## 2019-12-31 NOTE — DISCHARGE NOTE NURSING/CASE MANAGEMENT/SOCIAL WORK - NSDCPNINST_GEN_ALL_CORE
If any complaints of nausea, vomiting, fever, increasing weakness or change in mental status call primary MD or return to emergency room

## 2019-12-31 NOTE — PROGRESS NOTE ADULT - PROBLEM SELECTOR PLAN 4
hyperglycemic on admission, associated tingling in toes  -SSI w/ FS  -A1c 7.3
hyperglycemic on admission, associated tingling in toes  -SSI w/ FS  -f/u A1c
hyperglycemic on admission, associated tingling in toes  -SSI w/ FS  -f/u A1c

## 2020-01-01 ENCOUNTER — OUTPATIENT (OUTPATIENT)
Dept: OUTPATIENT SERVICES | Facility: HOSPITAL | Age: 75
LOS: 1 days | End: 2020-01-01
Payer: MEDICAID

## 2020-01-02 DIAGNOSIS — Z71.89 OTHER SPECIFIED COUNSELING: ICD-10-CM

## 2020-01-03 DIAGNOSIS — Z71.89 OTHER SPECIFIED COUNSELING: ICD-10-CM

## 2020-01-07 DIAGNOSIS — Z76.89 PERSONS ENCOUNTERING HEALTH SERVICES IN OTHER SPECIFIED CIRCUMSTANCES: ICD-10-CM

## 2020-01-07 PROBLEM — I10 ESSENTIAL (PRIMARY) HYPERTENSION: Chronic | Status: ACTIVE | Noted: 2019-12-29

## 2020-01-07 PROBLEM — E78.5 HYPERLIPIDEMIA, UNSPECIFIED: Chronic | Status: ACTIVE | Noted: 2019-12-29

## 2020-01-07 PROBLEM — E03.9 HYPOTHYROIDISM, UNSPECIFIED: Chronic | Status: ACTIVE | Noted: 2019-12-29

## 2020-01-07 PROBLEM — J45.909 UNSPECIFIED ASTHMA, UNCOMPLICATED: Chronic | Status: ACTIVE | Noted: 2019-12-29

## 2020-01-07 PROBLEM — K21.9 GASTRO-ESOPHAGEAL REFLUX DISEASE WITHOUT ESOPHAGITIS: Chronic | Status: ACTIVE | Noted: 2019-12-29

## 2020-01-07 PROBLEM — E11.9 TYPE 2 DIABETES MELLITUS WITHOUT COMPLICATIONS: Chronic | Status: ACTIVE | Noted: 2019-12-29

## 2020-02-01 PROCEDURE — G9005: CPT

## 2020-03-01 ENCOUNTER — OUTPATIENT (OUTPATIENT)
Dept: OUTPATIENT SERVICES | Facility: HOSPITAL | Age: 75
LOS: 1 days | End: 2020-03-01
Payer: MEDICAID

## 2020-04-07 DIAGNOSIS — Z71.89 OTHER SPECIFIED COUNSELING: ICD-10-CM

## 2020-10-01 PROCEDURE — G9005: CPT

## 2021-04-19 NOTE — PATIENT PROFILE ADULT - FUNCTIONAL SCREEN CURRENT LEVEL: BATHING, MLM
2 = assistive person Birth Control Pills Pregnancy And Lactation Text: This medication should be avoided if pregnant and for the first 30 days post-partum.

## 2021-07-06 NOTE — ED ADULT TRIAGE NOTE - BP NONINVASIVE SYSTOLIC (MM HG)
Medication requested    pregabalin (LYRICA) 75 mg capsule     SIG: Take 1 capsule by mouth 2 times daily.     QTY: 60    Days: 30      Last RX written:  03/23/2021  Last RX dispensed (per PDMP):  05/28/2021    Last office visit:  06/23/2021  Upcoming office visit:  11/29/2021    Patient due, prepped if agree    Routed to provider      Pharmacy [place], [place]      [address]

## 2024-09-16 NOTE — CONSULT NOTE ADULT - CONSULT REQUESTED DATE/TIME
29-Dec-2019 15:02 DATE OF SERVICE: 09-16-24      CHIEF COMPLAINT:Patient is a 77y old  Female who presents with a chief complaint of abdominal pain x few days (16 Sep 2024 11:45)      HISTORY OF PRESENT ILLNESS:HPI:  Patient is a 77  year old female w/pmh hypothyroidism , HTN , presents for abdominal pain over the past few days .   Two days prior to admission, patient reports right upper abdominal pain , sharp in character , radiating to back , worse with food symptoms associated with fever at the time, no diarrhea , no nausea or vomiting.  Since then , pain has mostly resolved , but patient reports poor appetite and decreased oral intake, and generalized weakness. She was evaluated by her PMD and noted to have tenderness in her RUQ and subsequently referred to the hospital.    (10 Sep 2024 06:01)      PAST MEDICAL & SURGICAL HISTORY:  Hypothyroid      HTN (hypertension)      S/P appendectomy      H/O umbilical hernia repair              MEDICATIONS:    piperacillin/tazobactam IVPB.. 3.375 Gram(s) IV Intermittent every 8 hours      acetaminophen     Tablet .. 650 milliGRAM(s) Oral every 6 hours PRN  melatonin 3 milliGRAM(s) Oral at bedtime PRN  memantine 5 milliGRAM(s) Oral two times a day  ondansetron Injectable 4 milliGRAM(s) IV Push every 8 hours PRN    aluminum hydroxide/magnesium hydroxide/simethicone Suspension 30 milliLiter(s) Oral every 4 hours PRN    levothyroxine 112 MICROGram(s) Oral daily    dextrose 5% + sodium chloride 0.45% with potassium chloride 20 mEq/L 1000 milliLiter(s) IV Continuous <Continuous>  folic acid 1 milliGRAM(s) Oral daily  influenza  Vaccine (HIGH DOSE) 0.5 milliLiter(s) IntraMuscular once      FAMILY HISTORY:  No pertinent family history in first degree relatives        Non-contributory    SOCIAL HISTORY:    [ ] former smoker    Allergies    No Known Allergies    Intolerances    	    REVIEW OF SYSTEMS:  CONSTITUTIONAL: No fever  EYES: No eye pain, visual disturbances, or discharge  ENMT:  No difficulty hearing, tinnitus  NECK: No pain or stiffness  RESPIRATORY: No cough, wheezing,  CARDIOVASCULAR: No chest pain, palpitations, passing out, dizziness, or leg swelling  GASTROINTESTINAL:  No nausea, vomiting, diarrhea or constipation. No melena.  GENITOURINARY: No dysuria, hematuria  NEUROLOGICAL: No stroke like symptoms  SKIN: No burning or lesions   ENDOCRINE: No heat or cold intolerance  MUSCULOSKELETAL: No joint pain or swelling  PSYCHIATRIC: No  anxiety, mood swings  HEME/LYMPH: No bleeding gums  ALLERGY AND IMMUNOLOGIC: No hives or eczema	    All other ROS negative    PHYSICAL EXAM:  T(C): 36.3 (09-16-24 @ 20:00), Max: 36.5 (09-16-24 @ 04:48)  HR: 50 (09-16-24 @ 20:00) (45 - 54)  BP: 137/72 (09-16-24 @ 20:00) (93/48 - 137/72)  RR: 18 (09-16-24 @ 20:00) (18 - 18)  SpO2: 95% (09-16-24 @ 20:00) (95% - 97%)  Wt(kg): --  I&O's Summary    15 Sep 2024 07:01  -  16 Sep 2024 07:00  --------------------------------------------------------  IN: 100 mL / OUT: 2 mL / NET: 98 mL    16 Sep 2024 07:01  -  16 Sep 2024 23:09  --------------------------------------------------------  IN: 870 mL / OUT: 0 mL / NET: 870 mL        Appearance: Normal	  HEENT:   Normal oral mucosa, EOMI	  Cardiovascular:  S1 S2, No JVD,    Respiratory: Lungs clear to auscultation	  Psychiatry: Alert  Gastrointestinal:  Soft, Non-tender, + BS	  Skin: No rashes   Neurologic: Non-focal  Extremities:  No edema  Vascular: Peripheral pulses palpable    	    	  	  CARDIAC MARKERS:  Labs personally reviewed by me                                  12.2   6.35  )-----------( 352      ( 16 Sep 2024 05:53 )             37.5     09-16    140  |  108  |  7   ----------------------------<  105<H>  4.1   |  22  |  0.65    Ca    9.2      16 Sep 2024 05:53  Phos  2.8     09-16  Mg     2.1     09-16    TPro  6.6  /  Alb  3.4  /  TBili  0.4  /  DBili  x   /  AST  25  /  ALT  65<H>  /  AlkPhos  214<H>  09-16          EKG: Personally reviewed by me - Sinus catracho @ 54bpm            Assessment and Plan:     77  F  hypothyroidism , HTN , presents for abdominal pain over the past few days found to have cholangitis and choledocholithiasis , S/P ERCP and is awaiting cholecystectomy.        Problem/Plan - 1:  ·  Problem: Preop Risk stratification  ·  Plan: Denies cardiac hx  - Reports good functional capacity  - Non ischemic EKG  - Mod risk for mod risk surgery, no cardiac contraindication to proceed    Problem/Plan - 2:  ·  Problem: Sinus catracho  ·  Plan: Asymptomatic, monitor vitals, off AV node blockers    Problem/Plan - 3:  ·  Problem: HTN (hypertension).   ·  Plan: - HOLD  losartan 50mg--> BP is soft so holding     Problem/Plan - 4:  ·  Problem: Prophylactic measure.   ·  Plan; cont with SCD             Differential diagnosis and plan of care discussed with patient after the evaluation. Counseling on diet, nutritional counseling, weight management, exercise and medication compliance was done.   Advanced care planning/advanced directives discussed with patient/family. DNR status including forceful chest compressions to attempt to restart the heart, ventilator support/artificial breathing, electric shock, artificial nutrition, health care proxy, Molst form all discussed with pt. Pt wishes to consider. Sixteen minutes spent on discussing advanced directives.          Al Avila DO [facility]  Cardiovascular Medicine  [address] [place] [address], [address]  Office [phone]  Available via call/text on [employer] Teams